# Patient Record
Sex: FEMALE | Race: WHITE | NOT HISPANIC OR LATINO | Employment: UNEMPLOYED | URBAN - NONMETROPOLITAN AREA
[De-identification: names, ages, dates, MRNs, and addresses within clinical notes are randomized per-mention and may not be internally consistent; named-entity substitution may affect disease eponyms.]

---

## 2017-07-10 ENCOUNTER — HOSPITAL ENCOUNTER (EMERGENCY)
Facility: HOSPITAL | Age: 57
Discharge: PSYCHIATRIC OR SUBSTANCE USE FACILITY | End: 2017-07-10
Attending: EMERGENCY MEDICINE | Admitting: EMERGENCY MEDICINE

## 2017-07-10 ENCOUNTER — HOSPITAL ENCOUNTER (INPATIENT)
Facility: HOSPITAL | Age: 57
LOS: 11 days | Discharge: HOME | End: 2017-07-21
Attending: PSYCHIATRY & NEUROLOGY | Admitting: PSYCHIATRY & NEUROLOGY

## 2017-07-10 VITALS
WEIGHT: 200 LBS | RESPIRATION RATE: 20 BRPM | TEMPERATURE: 99 F | SYSTOLIC BLOOD PRESSURE: 112 MMHG | OXYGEN SATURATION: 100 % | HEART RATE: 98 BPM | DIASTOLIC BLOOD PRESSURE: 68 MMHG | BODY MASS INDEX: 33.32 KG/M2 | HEIGHT: 65 IN

## 2017-07-10 DIAGNOSIS — F31.62 BIPOLAR DISORDER, CURRENT EPISODE MIXED, MODERATE (HCC): Primary | ICD-10-CM

## 2017-07-10 PROBLEM — F31.11 BIPOLAR 1 DISORDER, MANIC, MILD (HCC): Status: ACTIVE | Noted: 2017-07-10

## 2017-07-10 LAB
6-ACETYL MORPHINE: NEGATIVE
ALBUMIN SERPL-MCNC: 4.3 G/DL (ref 3.5–5)
ALBUMIN/GLOB SERPL: 1.4 G/DL (ref 1.5–2.5)
ALP SERPL-CCNC: 98 U/L (ref 35–104)
ALT SERPL W P-5'-P-CCNC: 16 U/L (ref 10–36)
AMPHET+METHAMPHET UR QL: NEGATIVE
ANION GAP SERPL CALCULATED.3IONS-SCNC: 2.4 MMOL/L (ref 3.6–11.2)
AST SERPL-CCNC: 23 U/L (ref 10–30)
B-HCG UR QL: NEGATIVE
BARBITURATES UR QL SCN: NEGATIVE
BASOPHILS # BLD AUTO: 0.04 10*3/MM3 (ref 0–0.3)
BASOPHILS NFR BLD AUTO: 0.4 % (ref 0–2)
BENZODIAZ UR QL SCN: NEGATIVE
BILIRUB SERPL-MCNC: 0.3 MG/DL (ref 0.2–1.8)
BILIRUB UR QL STRIP: NEGATIVE
BUN BLD-MCNC: 10 MG/DL (ref 7–21)
BUN/CREAT SERPL: 12 (ref 7–25)
BUPRENORPHINE SERPL-MCNC: NEGATIVE NG/ML
CALCIUM SPEC-SCNC: 10.1 MG/DL (ref 7.7–10)
CANNABINOIDS SERPL QL: NEGATIVE
CHLORIDE SERPL-SCNC: 111 MMOL/L (ref 99–112)
CLARITY UR: CLEAR
CO2 SERPL-SCNC: 28.6 MMOL/L (ref 24.3–31.9)
COCAINE UR QL: NEGATIVE
COLOR UR: YELLOW
CREAT BLD-MCNC: 0.83 MG/DL (ref 0.43–1.29)
DEPRECATED RDW RBC AUTO: 47.8 FL (ref 37–54)
EOSINOPHIL # BLD AUTO: 0.23 10*3/MM3 (ref 0–0.7)
EOSINOPHIL NFR BLD AUTO: 2.5 % (ref 0–5)
ERYTHROCYTE [DISTWIDTH] IN BLOOD BY AUTOMATED COUNT: 14.2 % (ref 11.5–14.5)
ETHANOL BLD-MCNC: <10 MG/DL
ETHANOL UR QL: <0.01 %
GFR SERPL CREATININE-BSD FRML MDRD: 71 ML/MIN/1.73
GLOBULIN UR ELPH-MCNC: 3 GM/DL
GLUCOSE BLD-MCNC: 98 MG/DL (ref 70–110)
GLUCOSE UR STRIP-MCNC: NEGATIVE MG/DL
HCT VFR BLD AUTO: 30.3 % (ref 37–47)
HGB BLD-MCNC: 9.9 G/DL (ref 12–16)
HGB UR QL STRIP.AUTO: NEGATIVE
IMM GRANULOCYTES # BLD: 0.02 10*3/MM3 (ref 0–0.03)
IMM GRANULOCYTES NFR BLD: 0.2 % (ref 0–0.5)
KETONES UR QL STRIP: NEGATIVE
LEUKOCYTE ESTERASE UR QL STRIP.AUTO: NEGATIVE
LYMPHOCYTES # BLD AUTO: 2.44 10*3/MM3 (ref 1–3)
LYMPHOCYTES NFR BLD AUTO: 26.8 % (ref 21–51)
MCH RBC QN AUTO: 30.3 PG (ref 27–33)
MCHC RBC AUTO-ENTMCNC: 32.7 G/DL (ref 33–37)
MCV RBC AUTO: 92.7 FL (ref 80–94)
METHADONE UR QL SCN: NEGATIVE
MONOCYTES # BLD AUTO: 0.72 10*3/MM3 (ref 0.1–0.9)
MONOCYTES NFR BLD AUTO: 7.9 % (ref 0–10)
NEUTROPHILS # BLD AUTO: 5.64 10*3/MM3 (ref 1.4–6.5)
NEUTROPHILS NFR BLD AUTO: 62.2 % (ref 30–70)
NITRITE UR QL STRIP: NEGATIVE
OPIATES UR QL: NEGATIVE
OSMOLALITY SERPL CALC.SUM OF ELEC: 282.1 MOSM/KG (ref 273–305)
OXYCODONE UR QL SCN: NEGATIVE
PCP UR QL SCN: NEGATIVE
PH UR STRIP.AUTO: 6.5 [PH] (ref 5–8)
PLATELET # BLD AUTO: 371 10*3/MM3 (ref 130–400)
PMV BLD AUTO: 8.6 FL (ref 6–10)
POTASSIUM BLD-SCNC: 4.3 MMOL/L (ref 3.5–5.3)
PROT SERPL-MCNC: 7.3 G/DL (ref 6–8)
PROT UR QL STRIP: NEGATIVE
RBC # BLD AUTO: 3.27 10*6/MM3 (ref 4.2–5.4)
SODIUM BLD-SCNC: 142 MMOL/L (ref 135–153)
SP GR UR STRIP: <=1.005 (ref 1–1.03)
UROBILINOGEN UR QL STRIP: NORMAL
WBC NRBC COR # BLD: 9.09 10*3/MM3 (ref 4.5–12.5)

## 2017-07-10 PROCEDURE — 81025 URINE PREGNANCY TEST: CPT | Performed by: PHYSICIAN ASSISTANT

## 2017-07-10 RX ORDER — ALUMINA, MAGNESIA, AND SIMETHICONE 2400; 2400; 240 MG/30ML; MG/30ML; MG/30ML
15 SUSPENSION ORAL EVERY 6 HOURS PRN
Status: DISCONTINUED | OUTPATIENT
Start: 2017-07-10 | End: 2017-07-21 | Stop reason: HOSPADM

## 2017-07-10 RX ORDER — HYDROXYZINE 50 MG/1
50 TABLET, FILM COATED ORAL EVERY 6 HOURS PRN
Status: DISCONTINUED | OUTPATIENT
Start: 2017-07-10 | End: 2017-07-21 | Stop reason: HOSPADM

## 2017-07-10 RX ORDER — QUETIAPINE FUMARATE 100 MG/1
100 TABLET, FILM COATED ORAL NIGHTLY
Status: DISCONTINUED | OUTPATIENT
Start: 2017-07-10 | End: 2017-07-10 | Stop reason: SDUPTHER

## 2017-07-10 RX ORDER — FAMOTIDINE 20 MG/1
20 TABLET, FILM COATED ORAL 2 TIMES DAILY PRN
Status: DISCONTINUED | OUTPATIENT
Start: 2017-07-10 | End: 2017-07-21 | Stop reason: HOSPADM

## 2017-07-10 RX ORDER — ECHINACEA PURPUREA EXTRACT 125 MG
2 TABLET ORAL AS NEEDED
Status: DISCONTINUED | OUTPATIENT
Start: 2017-07-10 | End: 2017-07-21 | Stop reason: HOSPADM

## 2017-07-10 RX ORDER — QUETIAPINE FUMARATE 100 MG/1
100 TABLET, FILM COATED ORAL NIGHTLY
COMMUNITY
End: 2017-07-21 | Stop reason: HOSPADM

## 2017-07-10 RX ORDER — TRAZODONE HYDROCHLORIDE 50 MG/1
50 TABLET ORAL NIGHTLY PRN
Status: DISCONTINUED | OUTPATIENT
Start: 2017-07-10 | End: 2017-07-21 | Stop reason: HOSPADM

## 2017-07-10 RX ORDER — NICOTINE 21 MG/24HR
1 PATCH, TRANSDERMAL 24 HOURS TRANSDERMAL
Status: DISCONTINUED | OUTPATIENT
Start: 2017-07-10 | End: 2017-07-21 | Stop reason: HOSPADM

## 2017-07-10 RX ORDER — BENZONATATE 100 MG/1
100 CAPSULE ORAL 3 TIMES DAILY PRN
Status: DISCONTINUED | OUTPATIENT
Start: 2017-07-10 | End: 2017-07-21 | Stop reason: HOSPADM

## 2017-07-10 RX ORDER — IBUPROFEN 400 MG/1
600 TABLET ORAL EVERY 6 HOURS PRN
Status: DISCONTINUED | OUTPATIENT
Start: 2017-07-10 | End: 2017-07-21 | Stop reason: HOSPADM

## 2017-07-10 RX ORDER — ONDANSETRON 4 MG/1
4 TABLET, FILM COATED ORAL EVERY 6 HOURS PRN
Status: DISCONTINUED | OUTPATIENT
Start: 2017-07-10 | End: 2017-07-21 | Stop reason: HOSPADM

## 2017-07-10 RX ORDER — LOPERAMIDE HYDROCHLORIDE 2 MG/1
2 CAPSULE ORAL 4 TIMES DAILY PRN
Status: DISCONTINUED | OUTPATIENT
Start: 2017-07-10 | End: 2017-07-21 | Stop reason: HOSPADM

## 2017-07-10 RX ORDER — QUETIAPINE FUMARATE 100 MG/1
100 TABLET, FILM COATED ORAL NIGHTLY
Status: DISCONTINUED | OUTPATIENT
Start: 2017-07-10 | End: 2017-07-11

## 2017-07-10 RX ADMIN — ALUMINUM HYDROXIDE, MAGNESIUM HYDROXIDE, AND DIMETHICONE 15 ML: 400; 400; 40 SUSPENSION ORAL at 21:55

## 2017-07-10 RX ADMIN — NICOTINE 1 PATCH: 21 PATCH TRANSDERMAL at 21:55

## 2017-07-10 RX ADMIN — HYDROXYZINE HYDROCHLORIDE 50 MG: 50 TABLET ORAL at 23:21

## 2017-07-10 RX ADMIN — QUETIAPINE FUMARATE 100 MG: 100 TABLET ORAL at 21:53

## 2017-07-10 RX ADMIN — FAMOTIDINE 20 MG: 20 TABLET ORAL at 23:21

## 2017-07-10 NOTE — ED PROVIDER NOTES
Subjective   Patient is a 56 y.o. female presenting with mental health disorder.   Mental Health Problem   Presenting symptoms: aggressive behavior, agitation and depression    Degree of incapacity (severity):  Moderate  Onset quality:  Gradual  Duration:  1 week  Timing:  Intermittent  Progression:  Waxing and waning  Chronicity:  Recurrent  Context: not alcohol use and not drug abuse    Treatment compliance:  Untreated  Relieved by:  Nothing  Worsened by:  Nothing  Associated symptoms: anxiety and feelings of worthlessness    Associated symptoms: no abdominal pain and no chest pain        Review of Systems   Constitutional: Negative.  Negative for fever.   HENT: Negative.    Respiratory: Negative.    Cardiovascular: Negative.  Negative for chest pain.   Gastrointestinal: Negative.  Negative for abdominal pain.   Endocrine: Negative.    Genitourinary: Negative.  Negative for dysuria.   Skin: Negative.    Neurological: Negative.    Psychiatric/Behavioral: Positive for agitation. The patient is nervous/anxious.    All other systems reviewed and are negative.      Past Medical History:   Diagnosis Date   • Bipolar disorder    • Mood disorder        No Known Allergies    History reviewed. No pertinent surgical history.    History reviewed. No pertinent family history.    Social History     Social History   • Marital status: Single     Spouse name: N/A   • Number of children: N/A   • Years of education: N/A     Social History Main Topics   • Smoking status: Current Every Day Smoker     Packs/day: 1.00     Types: Cigarettes   • Smokeless tobacco: None   • Alcohol use No   • Drug use: No      Comment: pt denies-family states they feel she is using   • Sexual activity: Defer     Other Topics Concern   • None     Social History Narrative   • None           Objective   Physical Exam   Constitutional: She is oriented to person, place, and time. She appears well-developed and well-nourished. No distress.   HENT:   Head:  Normocephalic and atraumatic.   Right Ear: External ear normal.   Left Ear: External ear normal.   Nose: Nose normal.   Eyes: Conjunctivae and EOM are normal. Pupils are equal, round, and reactive to light.   Neck: Normal range of motion. Neck supple. No JVD present. No tracheal deviation present.   Cardiovascular: Normal rate, regular rhythm and normal heart sounds.    No murmur heard.  Pulmonary/Chest: Effort normal and breath sounds normal. No respiratory distress. She has no wheezes.   Abdominal: Soft. Bowel sounds are normal. There is no tenderness.   Musculoskeletal: Normal range of motion. She exhibits no edema or deformity.   Neurological: She is alert and oriented to person, place, and time. No cranial nerve deficit.   Skin: Skin is warm and dry. No rash noted. She is not diaphoretic. No erythema. No pallor.   Psychiatric: She has a normal mood and affect. Her behavior is normal. Thought content normal.   Nursing note and vitals reviewed.      Procedures         ED Course  ED Course                  MDM  Number of Diagnoses or Management Options  Bipolar disorder, current episode mixed, moderate: established and worsening     Amount and/or Complexity of Data Reviewed  Clinical lab tests: ordered and reviewed  Discuss the patient with other providers: yes    Risk of Complications, Morbidity, and/or Mortality  Presenting problems: moderate        Final diagnoses:   Bipolar disorder, current episode mixed, moderate            MIKAEL Nichols  07/10/17 1809       MIKAEL Nichols  07/10/17 1930
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
## 2017-07-11 RX ORDER — QUETIAPINE FUMARATE 100 MG/1
100 TABLET, FILM COATED ORAL EVERY 12 HOURS SCHEDULED
Status: DISCONTINUED | OUTPATIENT
Start: 2017-07-11 | End: 2017-07-12

## 2017-07-11 RX ADMIN — IBUPROFEN 600 MG: 400 TABLET ORAL at 03:23

## 2017-07-11 RX ADMIN — BENZONATATE 100 MG: 100 CAPSULE ORAL at 09:53

## 2017-07-11 RX ADMIN — NICOTINE 1 PATCH: 21 PATCH TRANSDERMAL at 08:36

## 2017-07-11 RX ADMIN — IBUPROFEN 600 MG: 400 TABLET ORAL at 14:51

## 2017-07-11 RX ADMIN — HYDROXYZINE HYDROCHLORIDE 50 MG: 50 TABLET ORAL at 14:51

## 2017-07-11 RX ADMIN — FAMOTIDINE 20 MG: 20 TABLET ORAL at 15:52

## 2017-07-11 RX ADMIN — HYDROXYZINE HYDROCHLORIDE 50 MG: 50 TABLET ORAL at 08:36

## 2017-07-11 RX ADMIN — ALUMINUM HYDROXIDE, MAGNESIUM HYDROXIDE, AND DIMETHICONE 15 ML: 400; 400; 40 SUSPENSION ORAL at 09:06

## 2017-07-11 RX ADMIN — ALUMINUM HYDROXIDE, MAGNESIUM HYDROXIDE, AND DIMETHICONE 15 ML: 400; 400; 40 SUSPENSION ORAL at 21:40

## 2017-07-11 RX ADMIN — QUETIAPINE FUMARATE 100 MG: 100 TABLET ORAL at 21:04

## 2017-07-11 RX ADMIN — IBUPROFEN 600 MG: 400 TABLET ORAL at 22:31

## 2017-07-11 RX ADMIN — BENZONATATE 100 MG: 100 CAPSULE ORAL at 21:40

## 2017-07-11 RX ADMIN — HYDROXYZINE HYDROCHLORIDE 50 MG: 50 TABLET ORAL at 23:10
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
## 2017-07-11 NOTE — DISCHARGE INSTR - APPOINTMENTS
[place] [id number]  [facility] [id number]  [phone]    July 28 2017 at 8:30am with Daily
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
## 2017-07-11 NOTE — PLAN OF CARE
Problem: BH Patient Care Overview (Adult)  Goal: Plan of Care Review  Outcome: Ongoing (interventions implemented as appropriate)    07/11/17 1636   Coping/Psychosocial Response Interventions   Plan Of Care Reviewed With patient   Coping/Psychosocial   Patient Agreement with Plan of Care agrees   Patient Care Overview   Progress no change   Outcome Evaluation   Outcome Summary/Follow up Plan PT REPORTS ANXIETY 6/10. DENIES ANY DEPRESSION, SI, HI, OR A/V HALLUCINATIONS.         Problem:  Overarching Goals  Goal: Adheres to Safety Considerations for Self and Others  Outcome: Ongoing (interventions implemented as appropriate)  Goal: Optimized Coping Skills in Response to Life Stressors  Outcome: Ongoing (interventions implemented as appropriate)  Goal: Develops/Participates in Therapeutic [place] to Support Successful Transition  Outcome: Ongoing (interventions implemented as appropriate)

## 2017-07-11 NOTE — PLAN OF CARE
Problem: BH Overarching Goals  Goal: Develops/Participates in Therapeutic [place] to Support Successful Transition  Outcome: Ongoing (interventions implemented as appropriate)
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
## 2017-07-12 RX ORDER — QUETIAPINE FUMARATE 300 MG/1
300 TABLET, FILM COATED ORAL NIGHTLY
Status: DISCONTINUED | OUTPATIENT
Start: 2017-07-12 | End: 2017-07-13

## 2017-07-12 RX ORDER — QUETIAPINE FUMARATE 100 MG/1
100 TABLET, FILM COATED ORAL DAILY
Status: DISCONTINUED | OUTPATIENT
Start: 2017-07-13 | End: 2017-07-14

## 2017-07-12 RX ADMIN — NICOTINE 1 PATCH: 21 PATCH TRANSDERMAL at 08:15

## 2017-07-12 RX ADMIN — FAMOTIDINE 20 MG: 20 TABLET ORAL at 22:18

## 2017-07-12 RX ADMIN — QUETIAPINE FUMARATE 300 MG: 300 TABLET ORAL at 22:18

## 2017-07-12 RX ADMIN — BENZONATATE 100 MG: 100 CAPSULE ORAL at 08:15

## 2017-07-12 RX ADMIN — IBUPROFEN 600 MG: 400 TABLET ORAL at 14:06

## 2017-07-12 RX ADMIN — IBUPROFEN 600 MG: 400 TABLET ORAL at 23:44

## 2017-07-12 RX ADMIN — IBUPROFEN 600 MG: 400 TABLET ORAL at 08:25

## 2017-07-12 RX ADMIN — HYDROXYZINE HYDROCHLORIDE 50 MG: 50 TABLET ORAL at 14:05

## 2017-07-12 RX ADMIN — QUETIAPINE FUMARATE 100 MG: 100 TABLET ORAL at 08:14

## 2017-07-12 RX ADMIN — FAMOTIDINE 20 MG: 20 TABLET ORAL at 04:42

## 2017-07-12 RX ADMIN — HYDROXYZINE HYDROCHLORIDE 50 MG: 50 TABLET ORAL at 23:26

## 2017-07-12 NOTE — PLAN OF CARE
Problem:  Patient Care Overview (Adult)  Goal: Plan of Care Review  Outcome: Ongoing (interventions implemented as appropriate)    07/12/17 1442   Coping/Psychosocial Response Interventions   Plan Of Care Reviewed With patient   Coping/Psychosocial   Patient Agreement with Plan of Care agrees   Patient Care Overview   Progress no change   Outcome Evaluation   Outcome Summary/Follow up Plan pt. verbalzies has been up and down is oriented to self place rates anx. a 6 deneis any s/i SHE VERBALZIES SOMETIMES FEELS AFRAIDOF THE WORLD SHE WAS CO'S PEER IN ROOM WAS CUSSING @ her she wanted staff ot stand @ door when she goes into room staff Ceci ambrocio reported pt was getting peer clothing from closet private room order was obtained          Problem:  Overarching Goals  Goal: Adheres to Safety Considerations for Self and Others  Outcome: Ongoing (interventions implemented as appropriate)  Goal: Optimized Coping Skills in Response to Life Stressors  Outcome: Ongoing (interventions implemented as appropriate)  Goal: Develops/Participates in Therapeutic [place] to Support Successful Transition  Outcome: Ongoing (interventions implemented as appropriate)

## 2017-07-12 NOTE — PROGRESS NOTES
"   LOS: 2 days   Patient Care Team:  Karan Purcell MD as PCP - General (Family Medicine)    Chief Complaint:  Patient complains she had argument with roommate because she jumped up and hollered at her, told her to get out of the room, nurse accused her of wearing her roommates clothes.  She denies depression but says this has made her nervous, rates her anxiety at 7 or 8/10.  She has slept only 3-4 hours.  She says she is called and talked to her -2 sisters and a sister-in-law, and did \" great\" on the phone she says she wants to go back to her trailer and be with her dog and her son.  She says \"you can call if you want to\" when I asked her if he can talk to her son and daughter-in-law, gives the number as [phone].  She also gives permission to talk to her sisters or her sister-in-law.      Interval History: The have verification from her [facility] showing that she filled a prescription for Seroquel 300 mg twice a day by her family physician on 7/6/2017          Vital Signs    Temp:  [97.5 °F (36.4 °C)-97.7 °F (36.5 °C)] 97.5 °F (36.4 °C)  Heart Rate:  [86-93] 86  Resp:  [18] 18  BP: (138-148)/(79-86) 148/86    Lab Results:   Lab Results (last 24 hours)     ** No results found for the last 24 hours. **           Labs:     Lab Results (last 24 hours)     ** No results found for the last 24 hours. **                        Exam:  Completed:  completed within view of staff  Mental Status Exam:     Hygiene:   good  Cooperation:  Cooperative  Eye Contact:  Good  Psychomotor Behavior:  Aggitated much less than she was yesterday  Affect:  Appropriate  Speech:  Normal  Thought Progress:  Goal directed  Thought Content:  Mood congurent  Suicidal:  None  Homicidal:  None  Hallucinations:  None  Delusion:  Paranoid  Memory:  Intact  Orientation:  Person, Place, Time and Situation  Reliability:  fair  Insight:  Fair  Judgement:  Impaired  Impulse Control:  Fair      Results Review:    Lab Results (last 24 " hours)     ** No results found for the last 24 hours. **          Medication Review:  Hospital Medications (active)       Dose Frequency Start End    aluminum-magnesium hydroxide-simethicone (MAALOX MAX) 400-400-40 MG/5ML suspension 15 mL 15 mL Every 6 Hours PRN 7/10/2017     Sig - Route: Take 15 mL by mouth Every 6 (Six) Hours As Needed for Indigestion or Heartburn. - Oral    benzonatate (TESSALON) capsule 100 mg 100 mg 3 Times Daily PRN 7/10/2017     Sig - Route: Take 1 capsule by mouth 3 (Three) Times a Day As Needed for Cough. - Oral    famotidine (PEPCID) tablet 20 mg 20 mg 2 Times Daily PRN 7/10/2017     Sig - Route: Take 1 tablet by mouth 2 (Two) Times a Day As Needed for Heartburn. - Oral    hydrOXYzine (ATARAX) tablet 50 mg 50 mg Every 6 Hours PRN 7/10/2017     Sig - Route: Take 1 tablet by mouth Every 6 (Six) Hours As Needed for Anxiety. - Oral    ibuprofen (ADVIL,MOTRIN) tablet 600 mg 600 mg Every 6 Hours PRN 7/10/2017     Sig - Route: Take 1.5 tablets by mouth Every 6 (Six) Hours As Needed for Mild Pain (1-3) or Moderate Pain (4-6) (severe pain (7-10)). - Oral    loperamide (IMODIUM) capsule 2 mg 2 mg 4 Times Daily PRN 7/10/2017     Sig - Route: Take 1 capsule by mouth 4 (Four) Times a Day As Needed for Diarrhea. - Oral    magnesium hydroxide (MILK OF MAGNESIA) suspension 2400 mg/10mL 10 mL 10 mL Daily PRN 7/10/2017     Sig - Route: Take 10 mL by mouth Daily As Needed for Constipation. - Oral    nicotine (NICODERM CQ) 21 MG/24HR patch 1 patch 1 patch Every 24 Hours Scheduled 7/10/2017     Sig - Route: Place 1 patch on the skin Daily. - Transdermal    ondansetron (ZOFRAN) tablet 4 mg 4 mg Every 6 Hours PRN 7/10/2017     Sig - Route: Take 1 tablet by mouth Every 6 (Six) Hours As Needed for Nausea or Vomiting. - Oral    pneumococcal polysaccharide 23-valent (PNEUMOVAX-23) vaccine 0.5 mL 0.5 mL During Hospitalization 7/10/2017     Sig - Route: Inject 0.5 mL into the shoulder, thigh, or buttocks During  Hospitalization for Immunization. - Intramuscular    Cosign for Ordering: Required by Tavo Bustillo MD    QUEtiapine (SEROquel) tablet 100 mg 100 mg Every 12 Hours Scheduled 7/11/2017     Sig - Route: Take 1 tablet by mouth Every 12 (Twelve) Hours. - Oral    sodium chloride (OCEAN) nasal spray 2 spray 2 spray As Needed 7/10/2017     Sig - Route: 2 sprays by Each Nare route As Needed for Congestion. - Each Nare    traZODone (DESYREL) tablet 50 mg 50 mg Nightly PRN 7/10/2017     Sig - Route: Take 1 tablet by mouth At Night As Needed for Sleep. - Oral    QUEtiapine (SEROquel) tablet 100 mg (Discontinued) 100 mg Nightly 7/10/2017 7/11/2017    Sig - Route: Take 1 tablet by mouth Every Night. - Oral           Special Precautions Level: IV    Other Pertinent Information      Assessment/Plan     Assessment: Assessment/Diagnosis: Psychosis NOS      Treatment Plan: Change made to plan and Increase Seroquel to 100 mg a.m. and 300 mg at bedtime, therapist may call family for collateral information      Treatment Plan discussed with: Patient     I have r patienteviewed and approved the behavioral health treatment plans and problem list. Yes    Plan for disposition patient says she will return home     Tavo Bustillo MD  07/12/17  11:36 AM
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
## 2017-07-12 NOTE — PLAN OF CARE
Problem:  Patient Care Overview (Adult)  Goal: Plan of Care Review  Outcome: Ongoing (interventions implemented as appropriate)    07/12/17 0319   Coping/Psychosocial Response Interventions   Plan Of Care Reviewed With patient   Coping/Psychosocial   Patient Agreement with Plan of Care agrees   Patient Care Overview   Progress no change   Outcome Evaluation   Outcome Summary/Follow up Plan Patient calm and cooperative this shift. Rates anxiety a 5/10. Denies depression, SI/HI, or halllucinations.          Problem:  Overarching Goals  Goal: Adheres to Safety Considerations for Self and Others  Outcome: Ongoing (interventions implemented as appropriate)  Goal: Optimized Coping Skills in Response to Life Stressors  Outcome: Ongoing (interventions implemented as appropriate)  Goal: Develops/Participates in Therapeutic [place] to Support Successful Transition  Outcome: Ongoing (interventions implemented as appropriate)

## 2017-07-13 RX ADMIN — BENZONATATE 100 MG: 100 CAPSULE ORAL at 17:24

## 2017-07-13 RX ADMIN — QUETIAPINE FUMARATE 100 MG: 100 TABLET ORAL at 08:13

## 2017-07-13 RX ADMIN — NICOTINE 1 PATCH: 21 PATCH TRANSDERMAL at 08:13

## 2017-07-13 RX ADMIN — FAMOTIDINE 20 MG: 20 TABLET ORAL at 08:29

## 2017-07-13 RX ADMIN — IBUPROFEN 600 MG: 400 TABLET ORAL at 16:56

## 2017-07-13 RX ADMIN — HYDROXYZINE HYDROCHLORIDE 50 MG: 50 TABLET ORAL at 13:43

## 2017-07-13 RX ADMIN — QUETIAPINE FUMARATE 400 MG: 300 TABLET ORAL at 20:29

## 2017-07-13 RX ADMIN — HYDROXYZINE HYDROCHLORIDE 50 MG: 50 TABLET ORAL at 21:49

## 2017-07-13 RX ADMIN — TRAZODONE HYDROCHLORIDE 50 MG: 50 TABLET ORAL at 22:52

## 2017-07-13 NOTE — PLAN OF CARE
Problem:  Patient Care Overview (Adult)  Goal: Plan of Care Review  Outcome: Ongoing (interventions implemented as appropriate)    07/13/17 0253   Coping/Psychosocial Response Interventions   Plan Of Care Reviewed With patient   Coping/Psychosocial   Patient Agreement with Plan of Care agrees   Patient Care Overview   Progress no change   Outcome Evaluation   Outcome Summary/Follow up Plan Patient continues to seek out staff frequently asking for medications. Patient observed talking to self; responding to internal stimuli. Rates anxiety a 6/10. Denies any depression, SI/HI, or hallucinations.          Problem:  Overarching Goals  Goal: Adheres to Safety Considerations for Self and Others  Outcome: Ongoing (interventions implemented as appropriate)  Goal: Optimized Coping Skills in Response to Life Stressors  Outcome: Ongoing (interventions implemented as appropriate)  Goal: Develops/Participates in Therapeutic [place] to Support Successful Transition  Outcome: Ongoing (interventions implemented as appropriate)
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
## 2017-07-13 NOTE — PLAN OF CARE
Problem: BH Patient Care Overview (Adult)  Goal: Plan of Care Review  Outcome: Ongoing (interventions implemented as appropriate)  Pt. Verbalizes feels rested she verbalizes  doesn't know how long she slept was up during the night rates anx a 6 denies depression she is constantly at nurse desk co;s about channels on t.v. ,co's peers watching t.v. Talking Having t.v. Up to loud redirection given pt. Is cooperative     07/13/17 3418   Coping/Psychosocial Response Interventions   Plan Of Care Reviewed With patient   Coping/Psychosocial   Patient Agreement with Plan of Care agrees   Patient Care Overview   Progress improving         Problem:  Overarching Goals  Goal: Adheres to Safety Considerations for Self and Others  Outcome: Ongoing (interventions implemented as appropriate)  Goal: Optimized Coping Skills in Response to Life Stressors  Outcome: Ongoing (interventions implemented as appropriate)  Goal: Develops/Participates in Therapeutic [place] to Support Successful Transition  Outcome: Ongoing (interventions implemented as appropriate)

## 2017-07-13 NOTE — PROGRESS NOTES
"[id number]    DATA:      Therapist met individually with patient this date. Met in day room with RADHA conner.  Discussed progress in treatment and any needs/concerns. Patient continues to be paranoid and instigate arguments with peers. Has poor boundaries with peers and staff.      Therapist reviewed contact made with patient's brother Richar Kulkarni (please read therapy note from yesterday). She reports, \"Anything he tells you all is right. He knows me well.\" Therapist provided emotional support and continued education.  Patient reports that she is willing to stay in the hospital to get her medicines right.     Patient's daughter in law Daily has called unit.  Therapist attempted call back at [phone]. Reached Daily who reports that she has been patient's guardian for 6 years. She reports that she will bring documentation tonight.  Daily states that patient does live alone and that it has been a while since a full blown manic episode. She reports that she usually has these episodes around major changes in her life.  Most recent change was Daily having a child in late June and patient then stopping all medications and refusing to attend Dr. Madrigal. Patient will stop medications and then start smoking THC and Methamphetamine.   She reports that patient will go for days without sleep and that when she enters a hospital, Ativan is usually needed to get the patient to sleep and become cooperative.  Daily seems supportive of the patient, but reports that she is no where near her baseline and unsafe to return home while manic.       ASSESSMENT:     Patient observed paranoid affect and mood.  She paces unit floor and appears to instigate arguments with others. Patient has continued poor sleep and has been noted to respond to internal stimuli. Continues to be impacted by roseann and unsafe to discharge.        Plan:    Awaiting proof of guardianship.  Aftercare planning may switch to Dr. Madrigal once patient is stable and " guardianship is on file. Daily appears to prefer Dr. Madrigal referral instead of [facility].

## 2017-07-14 RX ORDER — QUETIAPINE FUMARATE 100 MG/1
100 TABLET, FILM COATED ORAL ONCE
Status: COMPLETED | OUTPATIENT
Start: 2017-07-14 | End: 2017-07-14

## 2017-07-14 RX ORDER — QUETIAPINE FUMARATE 100 MG/1
200 TABLET, FILM COATED ORAL DAILY
Status: DISCONTINUED | OUTPATIENT
Start: 2017-07-15 | End: 2017-07-18

## 2017-07-14 RX ADMIN — NICOTINE 1 PATCH: 21 PATCH TRANSDERMAL at 08:03

## 2017-07-14 RX ADMIN — IBUPROFEN 600 MG: 400 TABLET ORAL at 03:17

## 2017-07-14 RX ADMIN — QUETIAPINE FUMARATE 100 MG: 100 TABLET ORAL at 12:50

## 2017-07-14 RX ADMIN — BENZONATATE 100 MG: 100 CAPSULE ORAL at 19:25

## 2017-07-14 RX ADMIN — IBUPROFEN 600 MG: 400 TABLET ORAL at 11:20

## 2017-07-14 RX ADMIN — HYDROXYZINE HYDROCHLORIDE 50 MG: 50 TABLET ORAL at 10:54

## 2017-07-14 RX ADMIN — IBUPROFEN 600 MG: 400 TABLET ORAL at 21:54

## 2017-07-14 RX ADMIN — QUETIAPINE FUMARATE 400 MG: 300 TABLET ORAL at 20:39

## 2017-07-14 RX ADMIN — QUETIAPINE FUMARATE 100 MG: 100 TABLET ORAL at 08:03

## 2017-07-14 RX ADMIN — TRAZODONE HYDROCHLORIDE 50 MG: 50 TABLET ORAL at 21:41

## 2017-07-14 NOTE — PLAN OF CARE
Problem: BH Patient Care Overview (Adult)  Goal: Plan of Care Review  Outcome: Ongoing (interventions implemented as appropriate)  Rates anxiety 10 and depression 0. Denies suicidal thoughts.    07/14/17 1530   Coping/Psychosocial Response Interventions   Plan Of Care Reviewed With patient   Coping/Psychosocial   Patient Agreement with Plan of Care agrees   Patient Care Overview   Progress no change       Goal: Interdisciplinary Rounds/Family Conference  Outcome: Ongoing (interventions implemented as appropriate)  Goal: Individualization and Mutuality  Outcome: Ongoing (interventions implemented as appropriate)  Goal: Discharge Needs Assessment  Outcome: Ongoing (interventions implemented as appropriate)    Problem: BH Overarching Goals  Goal: Adheres to Safety Considerations for Self and Others  Outcome: Ongoing (interventions implemented as appropriate)  Goal: Optimized Coping Skills in Response to Life Stressors  Outcome: Ongoing (interventions implemented as appropriate)  Goal: Develops/Participates in Therapeutic [place] to Support Successful Transition  Outcome: Ongoing (interventions implemented as appropriate)    Problem: Manic Behavior/Episode (Adult)  Goal: Identify Related Risk Factors and Signs and Symptoms  Outcome: Ongoing (interventions implemented as appropriate)  Goal: Mood Equilibrium  Outcome: Ongoing (interventions implemented as appropriate)  Goal: Impulse Control  Outcome: Ongoing (interventions implemented as appropriate)

## 2017-07-14 NOTE — PROGRESS NOTES
"1145:     DATA:      Therapist met individually with patient this date. Discussed progress in treatment and any needs/concerns. Patient remains delusional. She reports, \"You working on my discharge medicines?\"  Patient remains redirectable, but overall intrusive and inappropriate. Continues to paranoid about daughter-in-law.  However, her brother in law appears to support the report of the guardian regarding patient being paranoid and manic.       ASSESSMENT:     Patient observed to have disorganized thought content.  She continues to be restless and paces unit floor. Poor boundaries with staff and peers.  Appears paranoid that others on unit are talking about her.  Family reports that Ativan helps her sleep when prescribed by Dr. Madrigal. Patient is intrusive with other patients, often standing behind them and rambling to them. Sometimes believing they are confronting her.  Says that she has fallen in love on the unit with a male peer.     Plan:    Patient to remain hospitalized through the weekend.  Awaiting proof of guardianship from daughter in law Daily.. Aftercare planning may switch to Dr. Madrigal once patient is stable and guardianship is on file. Daily appears to prefer Dr. Madrigal referral instead of [facility].   "

## 2017-07-14 NOTE — PROGRESS NOTES
LOS: 4 days   Patient Care Team:  Karan Purcell MD as PCP - General (Family Medicine)    Chief Complaint:  Patient says she is anxious today because she has fell in love with a pedro on the unit, also says that she has been in love with her brother-in-law.      Interval History:             Vital Signs    Temp:  [96.2 °F (35.7 °C)-98 °F (36.7 °C)] 96.2 °F (35.7 °C)  Heart Rate:  [] 92  Resp:  [18] 18  BP: (124-158)/(76-87) 124/76    Lab Results:   Lab Results (last 24 hours)     ** No results found for the last 24 hours. **           Labs:     Lab Results (last 24 hours)     ** No results found for the last 24 hours. **                        Exam:  Completed:  completed within view of staff  Mental Status Exam:     Hygiene:   good  Cooperation:  Cooperative  Eye Contact:  Good  Psychomotor Behavior:  Appropriate  Affect:  Appropriate  Speech:  Pressured  Thought Progress:  Disorganized  Thought Content:  Mood congurent  Suicidal:  None  Homicidal:  None  Hallucinations:  None  Delusion:  None  Memory:  Intact  Orientation:  Person, Place and Time  Reliability:  fair  Insight:  Poor  Judgement:  Poor  Impulse Control:  Fair      Results Review:    Lab Results (last 24 hours)     ** No results found for the last 24 hours. **          Medication Review:  Hospital Medications (active)       Dose Frequency Start End    aluminum-magnesium hydroxide-simethicone (MAALOX MAX) 400-400-40 MG/5ML suspension 15 mL 15 mL Every 6 Hours PRN 7/10/2017     Sig - Route: Take 15 mL by mouth Every 6 (Six) Hours As Needed for Indigestion or Heartburn. - Oral    benzonatate (TESSALON) capsule 100 mg 100 mg 3 Times Daily PRN 7/10/2017     Sig - Route: Take 1 capsule by mouth 3 (Three) Times a Day As Needed for Cough. - Oral    famotidine (PEPCID) tablet 20 mg 20 mg 2 Times Daily PRN 7/10/2017     Sig - Route: Take 1 tablet by mouth 2 (Two) Times a Day As Needed for Heartburn. - Oral    hydrOXYzine (ATARAX) tablet 50 mg 50 mg  Every 6 Hours PRN 7/10/2017     Sig - Route: Take 1 tablet by mouth Every 6 (Six) Hours As Needed for Anxiety. - Oral    ibuprofen (ADVIL,MOTRIN) tablet 600 mg 600 mg Every 6 Hours PRN 7/10/2017     Sig - Route: Take 1.5 tablets by mouth Every 6 (Six) Hours As Needed for Mild Pain (1-3) or Moderate Pain  (severe pain (7-10)). - Oral    loperamide (IMODIUM) capsule 2 mg 2 mg 4 Times Daily PRN 7/10/2017     Sig - Route: Take 1 capsule by mouth 4 (Four) Times a Day As Needed for Diarrhea. - Oral    magnesium hydroxide (MILK OF MAGNESIA) suspension 2400 mg/10mL 10 mL 10 mL Daily PRN 7/10/2017     Sig - Route: Take 10 mL by mouth Daily As Needed for Constipation. - Oral    nicotine (NICODERM CQ) 21 MG/24HR patch 1 patch 1 patch Every 24 Hours Scheduled 7/10/2017     Sig - Route: Place 1 patch on the skin Daily. - Transdermal    ondansetron (ZOFRAN) tablet 4 mg 4 mg Every 6 Hours PRN 7/10/2017     Sig - Route: Take 1 tablet by mouth Every 6 (Six) Hours As Needed for Nausea or Vomiting. - Oral    pneumococcal polysaccharide 23-valent (PNEUMOVAX-23) vaccine 0.5 mL 0.5 mL During Hospitalization 7/10/2017     Sig - Route: Inject 0.5 mL into the shoulder, thigh, or buttocks During Hospitalization for Immunization. - Intramuscular    Cosign for Ordering: Accepted by Tavo Bustillo MD on 7/12/2017  2:47 PM    QUEtiapine (SEROquel) tablet 100 mg 100 mg Daily 7/13/2017     Sig - Route: Take 1 tablet by mouth Daily. - Oral    QUEtiapine (SEROquel) tablet 400 mg 400 mg Nightly 7/13/2017     Sig - Route: Take 400 mg by mouth Every Night. - Oral    sodium chloride (OCEAN) nasal spray 2 spray 2 spray As Needed 7/10/2017     Sig - Route: 2 sprays by Each Nare route As Needed for Congestion. - Each Nare    traZODone (DESYREL) tablet 50 mg 50 mg Nightly PRN 7/10/2017     Sig - Route: Take 1 tablet by mouth At Night As Needed for Sleep. - Oral    QUEtiapine (SEROquel) tablet 300 mg (Discontinued) 300 mg Nightly 7/12/2017 7/13/2017     Sig - Route: Take 1 tablet by mouth Every Night. - Oral           Special Precautions Level: III        Assessment/Plan     Assessment: Psychosis NOS  Bipolar disorder hypomanic with psychosis    Treatment Plan: Change made to plan and Increase Seroquel to 200 mg a.m. and 400 mg at bedtime.  Patient declines  Latuda.      Treatment Plan discussed with: Patient    I have reviewed and approved the behavioral health treatment plans and problem list. Yes      Tavo Bustillo MD  07/14/17  10:54 AM

## 2017-07-14 NOTE — PLAN OF CARE
"Problem:  Patient Care Overview (Adult)  Goal: Plan of Care Review  Outcome: Ongoing (interventions implemented as appropriate)  Rated anxiety as 8 and depression as  9. Denies S.I. Pt. is intrusive and concerned about what is going on with the other patients and constantly telling staff what the other patients need. Pt. has slept approx. one hour, however this a.m. She says she has \"slept and slept and slept.    07/14/17 0556   Coping/Psychosocial Response Interventions   Plan Of Care Reviewed With patient   Coping/Psychosocial   Patient Agreement with Plan of Care agrees   Patient Care Overview   Progress no change         Problem:  Overarching Goals  Goal: Adheres to Safety Considerations for Self and Others  Outcome: Ongoing (interventions implemented as appropriate)  Goal: Optimized Coping Skills in Response to Life Stressors  Outcome: Ongoing (interventions implemented as appropriate)  Goal: Develops/Participates in Therapeutic [place] to Support Successful Transition  Outcome: Ongoing (interventions implemented as appropriate)      "

## 2017-07-15 PROCEDURE — 99232 SBSQ HOSP IP/OBS MODERATE 35: CPT | Performed by: PSYCHIATRY & NEUROLOGY

## 2017-07-15 RX ADMIN — NICOTINE 1 PATCH: 21 PATCH TRANSDERMAL at 08:01

## 2017-07-15 RX ADMIN — QUETIAPINE FUMARATE 400 MG: 300 TABLET ORAL at 21:33

## 2017-07-15 RX ADMIN — TRAZODONE HYDROCHLORIDE 50 MG: 50 TABLET ORAL at 23:01

## 2017-07-15 RX ADMIN — IBUPROFEN 600 MG: 400 TABLET ORAL at 13:32

## 2017-07-15 RX ADMIN — QUETIAPINE FUMARATE 200 MG: 100 TABLET ORAL at 08:01

## 2017-07-15 RX ADMIN — ALUMINUM HYDROXIDE, MAGNESIUM HYDROXIDE, AND DIMETHICONE 15 ML: 400; 400; 40 SUSPENSION ORAL at 04:39

## 2017-07-15 RX ADMIN — HYDROXYZINE HYDROCHLORIDE 50 MG: 50 TABLET ORAL at 09:22

## 2017-07-15 RX ADMIN — ALUMINUM HYDROXIDE, MAGNESIUM HYDROXIDE, AND DIMETHICONE 15 ML: 400; 400; 40 SUSPENSION ORAL at 15:28

## 2017-07-15 NOTE — PLAN OF CARE
Problem: BH Patient Care Overview (Adult)  Goal: Plan of Care Review  Outcome: Ongoing (interventions implemented as appropriate)  Rates anxiety 10 and depression 0. Denies suicidal thoughts.   Goal: Interdisciplinary Rounds/Family Conference  Outcome: Ongoing (interventions implemented as appropriate)  Goal: Individualization and Mutuality  Outcome: Ongoing (interventions implemented as appropriate)  Goal: Discharge Needs Assessment  Outcome: Ongoing (interventions implemented as appropriate)    Problem: BH Overarching Goals  Goal: Adheres to Safety Considerations for Self and Others  Outcome: Ongoing (interventions implemented as appropriate)  Goal: Optimized Coping Skills in Response to Life Stressors  Outcome: Ongoing (interventions implemented as appropriate)  Goal: Develops/Participates in Therapeutic [place] to Support Successful Transition  Outcome: Ongoing (interventions implemented as appropriate)    Problem: Manic Behavior/Episode (Adult)  Goal: Mood Equilibrium  Outcome: Ongoing (interventions implemented as appropriate)

## 2017-07-15 NOTE — PLAN OF CARE
Problem:  Patient Care Overview (Adult)  Goal: Plan of Care Review  Outcome: Ongoing (interventions implemented as appropriate)  Rated anxiety and depression as 10. Denies hallucinations, paranoia and S.I. Anticipates discharge on Monday. Has been aggravated by another intrusive patient on unit. Is needy and approaches staff often to tell on other patients or to make requests.    07/15/17 0635   Coping/Psychosocial Response Interventions   Plan Of Care Reviewed With patient   Coping/Psychosocial   Patient Agreement with Plan of Care agrees   Patient Care Overview   Progress no change         Problem:  Overarching Goals  Goal: Adheres to Safety Considerations for Self and Others  Outcome: Ongoing (interventions implemented as appropriate)  Goal: Optimized Coping Skills in Response to Life Stressors  Outcome: Ongoing (interventions implemented as appropriate)  Goal: Develops/Participates in Therapeutic [place] to Support Successful Transition  Outcome: Ongoing (interventions implemented as appropriate)

## 2017-07-15 NOTE — PROGRESS NOTES
"      PROGRESS NOTE  Clinician: Jonah Carlton MD  Admission Date: 7/10/2017  10:18 AM 07/15/17    Behavioral Health Treatment Plan and Problem List: I have reviewed and approved the Behavioral Health Treatment Plan and Problem list.    Allergies  No Known Allergies    Hospital Day: 5 days      Assessment completed within view of staff    History  CC: inpatient followup  Interval HPI: Patient seen and evaluated by me.  Chart reviewed. She expresses aggravation with another patient on the unit.  Denies AVH.  Anxiety elevated this morning.  Patient says she is tolerating her meds okay.      Interval Review of Systems:   General ROS: negative for - fever or malaise  Endocrine ROS: negative for - palpitations  Respiratory ROS: no cough, shortness of breath, or wheezing  Cardiovascular ROS: no chest pain or dyspnea on exertion  Gastrointestinal ROS: no abdominal pain,no black or bloody stools    /80 (BP Location: Left arm, Patient Position: Lying)  Pulse 82  Temp 97.5 °F (36.4 °C) (Temporal Artery )   Resp 18  Ht 65\" (165.1 cm)  Wt 200 lb 14 oz (91.1 kg)  SpO2 98%  BMI 33.43 kg/m2    Mental Status Exam  Mood/Affect:  anxious  Thought Processes:  associations intact  Thought Content:  paranoid ideation  Suicidal Thoughts:  none  Suicidal Plan/Intent: none  Homicidal Thoughts:  absent        Medical Decision Making:   Labs:     Lab Results (last 24 hours)     ** No results found for the last 24 hours. **            Radiology:     Imaging Results (last 24 hours)     ** No results found for the last 24 hours. **            EKG:     ECG/EMG Results (most recent)     None           Medications:     nicotine 1 patch Transdermal Q24H   QUEtiapine 200 mg Oral Daily   QUEtiapine 400 mg Oral Nightly          All medications reviewed.    Special Precautions: Continue current level of Special Precautions.    Assessment/Diagnosis:   Patient Active Problem List   Diagnosis Code   • Bipolar 1 disorder, manic, mild F31.11 "     Treatment Plan: Continue current treatment plan    Attestation:   Physician Attestation: I attest that the above note accurately reflects work and decisions made by me.

## 2017-07-16 PROCEDURE — 99232 SBSQ HOSP IP/OBS MODERATE 35: CPT | Performed by: PSYCHIATRY & NEUROLOGY

## 2017-07-16 RX ORDER — QUETIAPINE FUMARATE 25 MG/1
25 TABLET, FILM COATED ORAL ONCE
Status: COMPLETED | OUTPATIENT
Start: 2017-07-16 | End: 2017-07-16

## 2017-07-16 RX ADMIN — IBUPROFEN 600 MG: 400 TABLET ORAL at 19:53

## 2017-07-16 RX ADMIN — TRAZODONE HYDROCHLORIDE 50 MG: 50 TABLET ORAL at 21:49

## 2017-07-16 RX ADMIN — HYDROXYZINE HYDROCHLORIDE 50 MG: 50 TABLET ORAL at 23:48

## 2017-07-16 RX ADMIN — HYDROXYZINE HYDROCHLORIDE 50 MG: 50 TABLET ORAL at 11:12

## 2017-07-16 RX ADMIN — HYDROXYZINE HYDROCHLORIDE 50 MG: 50 TABLET ORAL at 17:21

## 2017-07-16 RX ADMIN — QUETIAPINE FUMARATE 400 MG: 300 TABLET ORAL at 21:49

## 2017-07-16 RX ADMIN — IBUPROFEN 600 MG: 400 TABLET ORAL at 05:23

## 2017-07-16 RX ADMIN — HYDROXYZINE HYDROCHLORIDE 50 MG: 50 TABLET ORAL at 05:13

## 2017-07-16 RX ADMIN — NICOTINE 1 PATCH: 21 PATCH TRANSDERMAL at 08:58

## 2017-07-16 RX ADMIN — QUETIAPINE FUMARATE 25 MG: 25 TABLET, FILM COATED ORAL at 17:42

## 2017-07-16 RX ADMIN — QUETIAPINE FUMARATE 25 MG: 25 TABLET, FILM COATED ORAL at 15:42

## 2017-07-16 RX ADMIN — QUETIAPINE FUMARATE 200 MG: 100 TABLET ORAL at 08:57

## 2017-07-16 NOTE — PLAN OF CARE
Problem: BH Patient Care Overview (Adult)  Goal: Plan of Care Review  Outcome: Ongoing (interventions implemented as appropriate)  Rates anxiety 10 and depression 0. Denies suicidal thoughts. Patient concerned about other patients and staff doing things to bothering her.   Goal: Interdisciplinary Rounds/Family Conference  Outcome: Ongoing (interventions implemented as appropriate)  Goal: Individualization and Mutuality  Outcome: Ongoing (interventions implemented as appropriate)  Goal: Discharge Needs Assessment  Outcome: Ongoing (interventions implemented as appropriate)    Problem:  Overarching Goals  Goal: Adheres to Safety Considerations for Self and Others  Outcome: Ongoing (interventions implemented as appropriate)  Goal: Optimized Coping Skills in Response to Life Stressors  Outcome: Ongoing (interventions implemented as appropriate)  Goal: Develops/Participates in Therapeutic [place] to Support Successful Transition  Outcome: Ongoing (interventions implemented as appropriate)    Problem: Manic Behavior/Episode (Adult)  Goal: Identify Related Risk Factors and Signs and Symptoms  Outcome: Ongoing (interventions implemented as appropriate)  Goal: Mood Equilibrium  Outcome: Ongoing (interventions implemented as appropriate)  Goal: Impulse Control  Outcome: Ongoing (interventions implemented as appropriate)

## 2017-07-16 NOTE — PLAN OF CARE
Problem:  Patient Care Overview (Adult)  Goal: Plan of Care Review  Outcome: Ongoing (interventions implemented as appropriate)  Rates anxiety as 10 and depression as 0. Denies S.I., hallucinations or paranoia. However, she seems to believe others are watching her or aggravating her a good amount of time. She does come to staff often and report what other patients are doing. Has been cooperative. Slept approx. 6 hours this shift.    07/16/17 0533   Coping/Psychosocial Response Interventions   Plan Of Care Reviewed With patient   Coping/Psychosocial   Patient Agreement with Plan of Care agrees   Patient Care Overview   Progress no change        Problem:  Overarching Goals  Goal: Adheres to Safety Considerations for Self and Others  Outcome: Ongoing (interventions implemented as appropriate)  Goal: Optimized Coping Skills in Response to Life Stressors  Outcome: Ongoing (interventions implemented as appropriate)  Goal: Develops/Participates in Therapeutic [place] to Support Successful Transition  Outcome: Ongoing (interventions implemented as appropriate)

## 2017-07-16 NOTE — PROGRESS NOTES
"      PROGRESS NOTE  Clinician: Jonah Carlton MD  Admission Date: 7/10/2017  11:05 AM 07/16/17    Behavioral Health Treatment Plan and Problem List: I have reviewed and approved the Behavioral Health Treatment Plan and Problem list.    Allergies  No Known Allergies    Hospital Day: 6 days      Assessment completed within view of staff    History  CC: inpatient followup  Interval HPI: Patient seen and evaluated by me.  Chart reviewed. Today is her Birthday.  Denies SI, AVH.  ROS negative as below. Tolerating meds okay. Staff reports that she has continued to demonstrate some signs of paranoia on the unit.    Interval Review of Systems:   General ROS: negative for - fever or malaise  Endocrine ROS: negative for - palpitations  Respiratory ROS: no cough, shortness of breath, or wheezing  Cardiovascular ROS: no chest pain or dyspnea on exertion  Gastrointestinal ROS: no abdominal pain,no black or bloody stools    /83 (BP Location: Left arm, Patient Position: Lying)  Pulse 101  Temp 97.5 °F (36.4 °C) (Temporal Artery )   Resp 18  Ht 65\" (165.1 cm)  Wt 200 lb 14 oz (91.1 kg)  SpO2 98%  BMI 33.43 kg/m2    Mental Status Exam  Mood/Affect: slightly anxious  Thought Processes:  linear, logical, and goal directed  Thought Content:  paranoid ideation  Suicidal Thoughts:  none  Suicidal Plan/Intent: none  Homicidal Thoughts:  absent        Medical Decision Making:   Labs:     Lab Results (last 24 hours)     ** No results found for the last 24 hours. **            Radiology:     Imaging Results (last 24 hours)     ** No results found for the last 24 hours. **            EKG:     ECG/EMG Results (most recent)     None           Medications:     nicotine 1 patch Transdermal Q24H   QUEtiapine 200 mg Oral Daily   QUEtiapine 400 mg Oral Nightly          All medications reviewed.    Special Precautions: Continue current level of Special Precautions.    Assessment/Diagnosis:   Patient Active Problem List   Diagnosis Code "   • Bipolar 1 disorder, manic, mild F31.11     Treatment Plan: Continue current treatment plan.  Continue Seroquel 200/400mg.    Attestation:   Physician Attestation: I attest that the above note accurately reflects work and decisions made by me.

## 2017-07-17 PROCEDURE — 25010000002 ZIPRASIDONE MESYLATE PER 10 MG

## 2017-07-17 PROCEDURE — 25010000002 LORAZEPAM PER 2 MG

## 2017-07-17 RX ORDER — LORAZEPAM 2 MG/ML
INJECTION INTRAMUSCULAR
Status: COMPLETED
Start: 2017-07-17 | End: 2017-07-17

## 2017-07-17 RX ORDER — LORAZEPAM 2 MG/ML
2 INJECTION INTRAMUSCULAR ONCE
Status: COMPLETED | OUTPATIENT
Start: 2017-07-17 | End: 2017-07-17

## 2017-07-17 RX ORDER — QUETIAPINE FUMARATE 100 MG/1
100 TABLET, FILM COATED ORAL ONCE
Status: COMPLETED | OUTPATIENT
Start: 2017-07-17 | End: 2017-07-17

## 2017-07-17 RX ORDER — ZIPRASIDONE MESYLATE 20 MG/ML
10 INJECTION, POWDER, LYOPHILIZED, FOR SOLUTION INTRAMUSCULAR ONCE
Status: COMPLETED | OUTPATIENT
Start: 2017-07-17 | End: 2017-07-17

## 2017-07-17 RX ORDER — ZIPRASIDONE MESYLATE 20 MG/ML
INJECTION, POWDER, LYOPHILIZED, FOR SOLUTION INTRAMUSCULAR
Status: COMPLETED
Start: 2017-07-17 | End: 2017-07-17

## 2017-07-17 RX ORDER — WATER 1000 ML/1000ML
INJECTION, SOLUTION INTRAVENOUS
Status: DISPENSED
Start: 2017-07-17 | End: 2017-07-17

## 2017-07-17 RX ADMIN — ZIPRASIDONE MESYLATE 10 MG: 20 INJECTION, POWDER, LYOPHILIZED, FOR SOLUTION INTRAMUSCULAR at 18:24

## 2017-07-17 RX ADMIN — ZIPRASIDONE MESYLATE 10 MG: 20 INJECTION, POWDER, LYOPHILIZED, FOR SOLUTION INTRAMUSCULAR at 08:49

## 2017-07-17 RX ADMIN — IBUPROFEN 600 MG: 400 TABLET ORAL at 13:43

## 2017-07-17 RX ADMIN — NICOTINE 1 PATCH: 21 PATCH TRANSDERMAL at 08:27

## 2017-07-17 RX ADMIN — QUETIAPINE FUMARATE 100 MG: 100 TABLET ORAL at 14:45

## 2017-07-17 RX ADMIN — QUETIAPINE FUMARATE 200 MG: 100 TABLET ORAL at 08:27

## 2017-07-17 RX ADMIN — LORAZEPAM 2 MG: 2 INJECTION INTRAMUSCULAR at 17:17

## 2017-07-17 RX ADMIN — HYDROXYZINE HYDROCHLORIDE 50 MG: 50 TABLET ORAL at 06:34

## 2017-07-17 RX ADMIN — LORAZEPAM 2 MG: 2 INJECTION INTRAMUSCULAR; INTRAVENOUS at 17:17

## 2017-07-17 RX ADMIN — IBUPROFEN 600 MG: 400 TABLET ORAL at 05:31

## 2017-07-17 NOTE — PROGRESS NOTES
LOS: 7 days   Patient Care Team:  Karan Purcell MD as PCP - General (Family Medicine)    Chief Complaint: Patient complains of a pedro 6 foot tall on the unit who came around her dangling like a hog.       Interval History: She has been in restraints for 2 hours this morning and has received a Geodon injection 10 mg IM            Vital Signs    Temp:  [97.4 °F (36.3 °C)-97.7 °F (36.5 °C)] 97.6 °F (36.4 °C)  Heart Rate:  [104-106] 106  Resp:  [18-20] 18  BP: ()/(66-87) 99/66    Lab Results:   Lab Results (last 24 hours)     ** No results found for the last 24 hours. **           Labs:     Lab Results (last 24 hours)     ** No results found for the last 24 hours. **                        Exam:  Completed:  completed within view of staff  Mental Status Exam:     Hygiene:   fair  Cooperation:  Cooperative  Eye Contact:  Good  Psychomotor Behavior:  Appropriate  Affect:  Appropriate  Speech:  Rambling  Thought Progress:  Disorganized  Thought Content:  Mood congurent  Suicidal:  None  Homicidal:  None  Hallucinations:  None  Delusion:  Paranoid  Memory:  Intact  Orientation:  Person, Place and Time  Reliability:  fair  Insight:  Poor  Judgement:  Impaired  Impulse Control:  Fair      Results Review:    Lab Results (last 24 hours)     ** No results found for the last 24 hours. **          Medication Review:  Hospital Medications (active)       Dose Frequency Start End    aluminum-magnesium hydroxide-simethicone (MAALOX MAX) 400-400-40 MG/5ML suspension 15 mL 15 mL Every 6 Hours PRN 7/10/2017     Sig - Route: Take 15 mL by mouth Every 6 (Six) Hours As Needed for Indigestion or Heartburn. - Oral    benzonatate (TESSALON) capsule 100 mg 100 mg 3 Times Daily PRN 7/10/2017     Sig - Route: Take 1 capsule by mouth 3 (Three) Times a Day As Needed for Cough. - Oral    famotidine (PEPCID) tablet 20 mg 20 mg 2 Times Daily PRN 7/10/2017     Sig - Route: Take 1 tablet by mouth 2 (Two) Times a Day As Needed for  Heartburn. - Oral    hydrOXYzine (ATARAX) tablet 50 mg 50 mg Every 6 Hours PRN 7/10/2017     Sig - Route: Take 1 tablet by mouth Every 6 (Six) Hours As Needed for Anxiety. - Oral    ibuprofen (ADVIL,MOTRIN) tablet 600 mg 600 mg Every 6 Hours PRN 7/10/2017     Sig - Route: Take 1.5 tablets by mouth Every 6 (Six) Hours As Needed for Mild Pain (1-3) or Moderate Pain  (severe pain (7-10)). - Oral    loperamide (IMODIUM) capsule 2 mg 2 mg 4 Times Daily PRN 7/10/2017     Sig - Route: Take 1 capsule by mouth 4 (Four) Times a Day As Needed for Diarrhea. - Oral    magnesium hydroxide (MILK OF MAGNESIA) suspension 2400 mg/10mL 10 mL 10 mL Daily PRN 7/10/2017     Sig - Route: Take 10 mL by mouth Daily As Needed for Constipation. - Oral    nicotine (NICODERM CQ) 21 MG/24HR patch 1 patch 1 patch Every 24 Hours Scheduled 7/10/2017     Sig - Route: Place 1 patch on the skin Daily. - Transdermal    ondansetron (ZOFRAN) tablet 4 mg 4 mg Every 6 Hours PRN 7/10/2017     Sig - Route: Take 1 tablet by mouth Every 6 (Six) Hours As Needed for Nausea or Vomiting. - Oral    pneumococcal polysaccharide 23-valent (PNEUMOVAX-23) vaccine 0.5 mL 0.5 mL During Hospitalization 7/10/2017     Sig - Route: Inject 0.5 mL into the shoulder, thigh, or buttocks During Hospitalization for Immunization. - Intramuscular    Cosign for Ordering: Accepted by Tavo Bustillo MD on 7/12/2017  2:47 PM    QUEtiapine (SEROquel) tablet 200 mg 200 mg Daily 7/15/2017     Sig - Route: Take 2 tablets by mouth Daily. - Oral    QUEtiapine (SEROquel) tablet 25 mg 25 mg Once 7/16/2017 7/16/2017    Sig - Route: Take 1 tablet by mouth 1 (One) Time. - Oral    QUEtiapine (SEROquel) tablet 25 mg 25 mg Once 7/16/2017 7/16/2017    Sig - Route: Take 1 tablet by mouth 1 (One) Time. - Oral    QUEtiapine (SEROquel) tablet 400 mg 400 mg Nightly 7/13/2017     Sig - Route: Take 400 mg by mouth Every Night. - Oral    sodium chloride (OCEAN) nasal spray 2 spray 2 spray As Needed  7/10/2017     Sig - Route: 2 sprays by Each Nare route As Needed for Congestion. - Each Nare    sterile water (preservative free) injection  - ADS Override Pull   7/17/2017 7/17/2017    Notes to Pharmacy: Created by cabinet override    traZODone (DESYREL) tablet 50 mg 50 mg Nightly PRN 7/10/2017     Sig - Route: Take 1 tablet by mouth At Night As Needed for Sleep. - Oral    ziprasidone (GEODON) injection 10 mg 10 mg Once 7/17/2017 7/17/2017    Sig - Route: Inject 10 mg into the shoulder, thigh, or buttocks 1 (One) Time. - Intramuscular           Special Precautions Level: III        Assessment/Plan     Assessment: Assessment: Psychosis NOS  Bipolar disorder hypomanic with psychosis      Treatment Plan: Will. keep on Geodon 10 mg IM every 6 hours when necessary for agitation      Treatment Plan discussed with: Patient    I have reviewed and approved the behavioral health treatment plans and problem list. Yes        Tavo Bustillo MD  07/17/17  10:39 AM

## 2017-07-17 NOTE — NURSING NOTE
Patient is very verbally abusive to staff and trying to tell other patients on unit what to do.  Came to nursing stations and started beating on desk, coming behind nursing stations and demanding something to color with (she was marking on day room christopher earlier).  Dr. Bustillo notified of negative behaviors and not following staff redirections.  Seroquel 100mg po ordered now with order repeated back to Dr. Bustillo x2.

## 2017-07-17 NOTE — NURSING NOTE
Dr. Bustillo contacted about patients behaviors and threats and patient having to be restrained at 7am.  Gave order for restraints.

## 2017-07-17 NOTE — NURSING NOTE
Patient still in restraint room screaming, broke leg restraints from kicking feet and had to have another pair reapplied.  No circulatory impairments noted but is still very agitated and yelling like an , sitting up on restraint bed and not listening to staff.  1:1 monitoring with staff.

## 2017-07-17 NOTE — NURSING NOTE
Face to Face Evaluation for Restraint/Seculsion    Behavior Observed: Patient continues to be yelling and saying that she is going to hurt someone.  Calling staff F---ing cunts.  Telling staff that she is going to rip those inappropriate clothing off of them.  Patient refused vital signs.  Is alert but very flight of ideas.  Contacted Dr. Bustillo with findings in agreement restraint to continue.  Recommendations: Continue restraint.  DC criteria not met.    Melanie Ramirez RN  07/17/17  8:46 AM

## 2017-07-17 NOTE — NURSING NOTE
Patient was in dye and thought another patient had her shirt on and tried to remove it but redirected easily because staff was right there.  Taken back to her room and continues to curse at staff but re-directioning easily.

## 2017-07-17 NOTE — NURSING NOTE
Patient in restraint room, cursing called nurse laverne padilla and slut, fighting with restraints, patient did take few sips of water, no circulatory impairments noted, sitting up and yelling at SP1 sitter.

## 2017-07-17 NOTE — PLAN OF CARE
Problem: BH Overarching Goals  Goal: Adheres to Safety Considerations for Self and Others  Outcome: Ongoing (interventions implemented as appropriate)  Goal: Optimized Coping Skills in Response to Life Stressors  Outcome: Ongoing (interventions implemented as appropriate)  Goal: Develops/Participates in Therapeutic [place] to Support Successful Transition  Outcome: Ongoing (interventions implemented as appropriate)

## 2017-07-17 NOTE — PLAN OF CARE
Problem:  Patient Care Overview (Adult)  Goal: Plan of Care Review  Outcome: Ongoing (interventions implemented as appropriate)  Pt. Is more obstinate and confrontational. Arguing with patients and staff over anything that is said or done. Feels people are doing things to her or watching her. Misconstrues what is going on and fabricates things about it. Rated anxiety as 10 and depression as 0. Denies S.I. Believes she'll be discharged today. Has her belongings and some  Hospital linen bagged up in her room. Has not slept tonight.    07/17/17 0534   Coping/Psychosocial Response Interventions   Plan Of Care Reviewed With patient   Coping/Psychosocial   Patient Agreement with Plan of Care agrees   Patient Care Overview   Progress declining        Problem:  Overarching Goals  Goal: Adheres to Safety Considerations for Self and Others  Outcome: Ongoing (interventions implemented as appropriate)  Goal: Optimized Coping Skills in Response to Life Stressors  Outcome: Ongoing (interventions implemented as appropriate)  Goal: Develops/Participates in Therapeutic [place] to Support Successful Transition  Outcome: Ongoing (interventions implemented as appropriate)

## 2017-07-17 NOTE — NURSING NOTE
Debriefing done with Dr. Bustillo, Lead Melanie Mckoy RN and decided to release restraints, behavior has improved some and still cursing but not aggressive yet.

## 2017-07-17 NOTE — NURSING NOTE
Patient still cursing and threatening staff, refused water and did not need to go to BR.  No circulatory impairments noted.

## 2017-07-17 NOTE — NURSING NOTE
Spoke with Dr. Bustillo about patient's continued behavior even while in restraints.  New order for Geodon 10mg IM x 1 dose.

## 2017-07-17 NOTE — NURSING NOTE
Face to Face Evaluation for Restraint/Seculsion    Behavior Observed: One hour face to face completed.  Patient continues to yell at the top of her lungs kill,kill, kill and told the [facility] that she was going to find out where she lives and cut her head off and murder her.  Patient does not appear to be oriented to anything but her name.  Findings reported to Dr. Bustillo in agreement for restraint to continue.  She also ordered Geodon 10mg IM one time dose.  Recommendations:  Continue restraints    Melanie Ramirez RN  07/17/17  6:59 PM

## 2017-07-17 NOTE — PLAN OF CARE
Problem:  Patient Care Overview (Adult)  Goal: Interdisciplinary Rounds/Family Conference  Outcome: Ongoing (interventions implemented as appropriate)    07/17/17 1056   Interdisciplinary Rounds/Family Conf   Summary Treatment team staffing    Participants social work;psychiatrist;nursing      1050:      Therapist staffed case with treatment team.  Patient has been delusional today and required retraints and Geodon injection. Continues to curse at staff and instigate arguments with peers. Therapist has spoken with daughter-in-law about need to have written documentation of guardianship. Family has of yet brought documentation. Therapist will continue to assist as needed.

## 2017-07-18 RX ORDER — ZIPRASIDONE HYDROCHLORIDE 20 MG/1
20 CAPSULE ORAL 2 TIMES DAILY WITH MEALS
Status: DISCONTINUED | OUTPATIENT
Start: 2017-07-18 | End: 2017-07-20

## 2017-07-18 RX ADMIN — QUETIAPINE FUMARATE 400 MG: 300 TABLET ORAL at 20:36

## 2017-07-18 RX ADMIN — IBUPROFEN 600 MG: 400 TABLET ORAL at 05:21

## 2017-07-18 RX ADMIN — TRAZODONE HYDROCHLORIDE 50 MG: 50 TABLET ORAL at 21:46

## 2017-07-18 RX ADMIN — ZIPRASIDONE HYDROCHLORIDE 20 MG: 20 CAPSULE ORAL at 12:02

## 2017-07-18 RX ADMIN — QUETIAPINE FUMARATE 200 MG: 100 TABLET ORAL at 08:42

## 2017-07-18 RX ADMIN — NICOTINE 1 PATCH: 21 PATCH TRANSDERMAL at 08:43

## 2017-07-18 RX ADMIN — HYDROXYZINE HYDROCHLORIDE 50 MG: 50 TABLET ORAL at 18:18

## 2017-07-18 RX ADMIN — HYDROXYZINE HYDROCHLORIDE 50 MG: 50 TABLET ORAL at 05:21

## 2017-07-18 RX ADMIN — HYDROXYZINE HYDROCHLORIDE 50 MG: 50 TABLET ORAL at 12:03

## 2017-07-18 RX ADMIN — IBUPROFEN 600 MG: 400 TABLET ORAL at 18:18

## 2017-07-18 RX ADMIN — ZIPRASIDONE HYDROCHLORIDE 20 MG: 20 CAPSULE ORAL at 17:01

## 2017-07-18 RX ADMIN — IBUPROFEN 600 MG: 400 TABLET ORAL at 12:03

## 2017-07-18 NOTE — PLAN OF CARE
Problem:  Patient Care Overview (Adult)  Goal: Plan of Care Review  Outcome: Ongoing (interventions implemented as appropriate)    07/18/17 0449   Coping/Psychosocial Response Interventions   Plan Of Care Reviewed With patient   Coping/Psychosocial   Patient Agreement with Plan of Care agrees   Patient Care Overview   Progress no change   Outcome Evaluation   Outcome Summary/Follow up Plan Patient has been calm and cooperative since discontinuation of restraints. Denies anxiety, depression, SI/HI, or hallucinations.          Problem:  Overarching Goals  Goal: Adheres to Safety Considerations for Self and Others  Outcome: Ongoing (interventions implemented as appropriate)  Goal: Optimized Coping Skills in Response to Life Stressors  Outcome: Ongoing (interventions implemented as appropriate)  Goal: Develops/Participates in Therapeutic [place] to Support Successful Transition  Outcome: Ongoing (interventions implemented as appropriate)

## 2017-07-18 NOTE — PLAN OF CARE
"Problem:  Patient Care Overview (Adult)  Goal: Discharge Needs Assessment  Outcome: Ongoing (interventions implemented as appropriate)    07/11/17 1235 07/18/17 1449   Discharge Needs Assessment   Concerns To Be Addressed mental health concerns;cognitive/perceptual concerns;coping/stress concerns;substance/tobacco abuse/use concerns --    Readmission Within The Last 30 Days --  no previous admission in last 30 days   Community [facility]  --    Current Discharge Risk substance abuse;psychiatric illness --    Discharge Planning Comments Patient has Medicare AB and [place] medicaid for medications. --    Discharge Needs Assessment   Outpatient/Agency/Support Group Needs outpatient medication management;outpatient psychiatric care (specify);outpatient counseling;outpatient substance abuse treatment (specify) --    Anticipated Discharge Disposition home or self-care --    Living Environment   Transportation Available public transportation --    DATA: Patient was observed in the day room this afternoon arguing with a peer and calling a patient names.  Patient was redirected multiple times by nursing staff.  Later met with patient and she told several stories about her daughter in law and about a brother in law.  Patient struggled to stay on topic.  Patient stated that she wanted to kill her daughter in law by punching her in her \"big face.\"  Patient then reported that she knows she cannot kill anyone so she needs to know what would be the appropriate way to handle the situation.  She reported that she discussed with the psychiatrist and with her sisters a plan to take a restraining order on her daughter in law due to all the things that the daughter in law has done to patient.      ASSESSMENT: Patient speech was pressured while meeting with this therapist and she struggled to sit still.  Patient was observed arguing with a peer and calling a patient names, patient as redirected multiple times.  Patient was " also observed pushing items out of her way on the counter at the nurses station and expressing her frustration with nursing staff.  Patient was later observed interacting calmly with other patients in the day room.  Patient continues to struggle with aggressive behaviors with staff and peers.  Patient has had medication changes and will be observed over the next 24 hours for improvements.  Patient is not currently stable for discharge.        PLAN:  Patient is planned to return home upon her stabilization and planned to follow up with [facility] in [place].

## 2017-07-18 NOTE — PLAN OF CARE
Problem: BH Patient Care Overview (Adult)  Goal: Plan of Care Review  Outcome: Ongoing (interventions implemented as appropriate)    07/18/17 1613   Coping/Psychosocial Response Interventions   Plan Of Care Reviewed With patient   Coping/Psychosocial   Patient Agreement with Plan of Care agrees   Patient Care Overview   Progress no change   Outcome Evaluation   Outcome Summary/Follow up Plan Remains argumenitive and frequently seeks out staff . Lots of redirection required. Denies si/hi.         Problem:  Overarching Goals  Goal: Adheres to Safety Considerations for Self and Others  Outcome: Ongoing (interventions implemented as appropriate)  Goal: Optimized Coping Skills in Response to Life Stressors  Outcome: Ongoing (interventions implemented as appropriate)  Goal: Develops/Participates in Therapeutic [place] to Support Successful Transition  Outcome: Ongoing (interventions implemented as appropriate)
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
## 2017-07-18 NOTE — NURSING NOTE
Dr. Bustillo notified of discontinuation of restraints. Head to toe assessment completed; no injuries noted. Patient calm and cooperative at present and request to go to room to eat a snack.

## 2017-07-19 PROCEDURE — 25010000002 LORAZEPAM PER 2 MG

## 2017-07-19 PROCEDURE — 25010000002 DIPHENHYDRAMINE PER 50 MG

## 2017-07-19 PROCEDURE — 25010000002 HALOPERIDOL LACTATE PER 5 MG

## 2017-07-19 PROCEDURE — 93005 ELECTROCARDIOGRAM TRACING: CPT | Performed by: PSYCHIATRY & NEUROLOGY

## 2017-07-19 RX ORDER — DIPHENHYDRAMINE HYDROCHLORIDE 50 MG/ML
INJECTION INTRAMUSCULAR; INTRAVENOUS
Status: COMPLETED
Start: 2017-07-19 | End: 2017-07-19

## 2017-07-19 RX ORDER — DIPHENHYDRAMINE HYDROCHLORIDE 50 MG/ML
50 INJECTION INTRAMUSCULAR; INTRAVENOUS ONCE
Status: COMPLETED | OUTPATIENT
Start: 2017-07-19 | End: 2017-07-19

## 2017-07-19 RX ORDER — HALOPERIDOL 5 MG/ML
INJECTION INTRAMUSCULAR
Status: COMPLETED
Start: 2017-07-19 | End: 2017-07-19

## 2017-07-19 RX ORDER — LORAZEPAM 2 MG/ML
INJECTION INTRAMUSCULAR
Status: COMPLETED
Start: 2017-07-19 | End: 2017-07-19

## 2017-07-19 RX ORDER — LORAZEPAM 2 MG/ML
2 INJECTION INTRAMUSCULAR ONCE
Status: COMPLETED | OUTPATIENT
Start: 2017-07-19 | End: 2017-07-19

## 2017-07-19 RX ORDER — HALOPERIDOL 5 MG/ML
5 INJECTION INTRAMUSCULAR ONCE
Status: COMPLETED | OUTPATIENT
Start: 2017-07-19 | End: 2017-07-19

## 2017-07-19 RX ADMIN — ZIPRASIDONE HYDROCHLORIDE 20 MG: 20 CAPSULE ORAL at 08:24

## 2017-07-19 RX ADMIN — HALOPERIDOL LACTATE 5 MG: 5 INJECTION, SOLUTION INTRAMUSCULAR at 20:53

## 2017-07-19 RX ADMIN — NICOTINE 1 PATCH: 21 PATCH TRANSDERMAL at 08:24

## 2017-07-19 RX ADMIN — LORAZEPAM 2 MG: 2 INJECTION INTRAMUSCULAR at 20:55

## 2017-07-19 RX ADMIN — HYDROXYZINE HYDROCHLORIDE 50 MG: 50 TABLET ORAL at 15:24

## 2017-07-19 RX ADMIN — QUETIAPINE FUMARATE 400 MG: 300 TABLET ORAL at 21:00

## 2017-07-19 RX ADMIN — IBUPROFEN 600 MG: 400 TABLET ORAL at 16:35

## 2017-07-19 RX ADMIN — HYDROXYZINE HYDROCHLORIDE 50 MG: 50 TABLET ORAL at 09:31

## 2017-07-19 RX ADMIN — FAMOTIDINE 20 MG: 20 TABLET ORAL at 05:08

## 2017-07-19 RX ADMIN — HALOPERIDOL 5 MG: 5 INJECTION INTRAMUSCULAR at 20:53

## 2017-07-19 RX ADMIN — DIPHENHYDRAMINE HYDROCHLORIDE 50 MG: 50 INJECTION INTRAMUSCULAR; INTRAVENOUS at 20:55

## 2017-07-19 RX ADMIN — IBUPROFEN 600 MG: 400 TABLET ORAL at 00:23

## 2017-07-19 RX ADMIN — LORAZEPAM 2 MG: 2 INJECTION INTRAMUSCULAR; INTRAVENOUS at 20:55

## 2017-07-19 RX ADMIN — ZIPRASIDONE HYDROCHLORIDE 20 MG: 20 CAPSULE ORAL at 18:26

## 2017-07-19 RX ADMIN — HYDROXYZINE HYDROCHLORIDE 50 MG: 50 TABLET ORAL at 00:23

## 2017-07-19 NOTE — PLAN OF CARE
Problem:  Patient Care Overview (Adult)  Goal: Plan of Care Review  Outcome: Ongoing (interventions implemented as appropriate)    07/19/17 1643   Coping/Psychosocial Response Interventions   Plan Of Care Reviewed With patient   Coping/Psychosocial   Patient Agreement with Plan of Care agrees   Patient Care Overview   Progress no change   Outcome Evaluation   Outcome Summary/Follow up Plan Argues with staff and other patients. Requires much redirection. Rates anxiety and depression a 10. Approaches staff frequent.         Problem:  Overarching Goals  Goal: Adheres to Safety Considerations for Self and Others  Outcome: Ongoing (interventions implemented as appropriate)  Goal: Optimized Coping Skills in Response to Life Stressors  Outcome: Ongoing (interventions implemented as appropriate)  Goal: Develops/Participates in Therapeutic [place] to Support Successful Transition  Outcome: Ongoing (interventions implemented as appropriate)

## 2017-07-19 NOTE — PLAN OF CARE
Problem:  Patient Care Overview (Adult)  Goal: Plan of Care Review  Outcome: Ongoing (interventions implemented as appropriate)    07/19/17 0507   Coping/Psychosocial Response Interventions   Plan Of Care Reviewed With patient   Coping/Psychosocial   Patient Agreement with Plan of Care agrees   Patient Care Overview   Progress no change   Outcome Evaluation   Outcome Summary/Follow up Plan Pt continues to be argumentative with staff. Pt required frequent redirection.          Problem:  Overarching Goals  Goal: Adheres to Safety Considerations for Self and Others  Outcome: Ongoing (interventions implemented as appropriate)  Goal: Optimized Coping Skills in Response to Life Stressors  Outcome: Ongoing (interventions implemented as appropriate)  Goal: Develops/Participates in Therapeutic [place] to Support Successful Transition  Outcome: Ongoing (interventions implemented as appropriate)
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
## 2017-07-19 NOTE — NURSING NOTE
"ASSESSMENT REPORT GIVEN TO ROSS COLEMAN RN. CLIENT STATES I WANT AN ATIVAN SHOT BEFORE I GO TO BED. YOU NEED TO GET ME ONE ORDERED.\" TEACHING DONE ABOUT MEDICATION REGIME. CLIENT ACKNOWLEDGES VERBAL UNDERSTANDING OF MEDICATIONS. CLIENT IS TALKATIVE .  "

## 2017-07-19 NOTE — NURSING NOTE
"Patient hasn't slept any this shift. Patient remains manic; pacing the dayroom while talking to self. Patient dressing inappropriately. Patient is preoccupied with getting \"geodon and ativan\" prescribed to take home with her. Patient very argumentative with staff. Patient required frequently redirection this shift.   "

## 2017-07-20 RX ORDER — ZIPRASIDONE HYDROCHLORIDE 40 MG/1
40 CAPSULE ORAL 2 TIMES DAILY WITH MEALS
Status: DISCONTINUED | OUTPATIENT
Start: 2017-07-20 | End: 2017-07-21 | Stop reason: HOSPADM

## 2017-07-20 RX ADMIN — IBUPROFEN 600 MG: 400 TABLET ORAL at 15:47

## 2017-07-20 RX ADMIN — HYDROXYZINE HYDROCHLORIDE 50 MG: 50 TABLET ORAL at 13:51

## 2017-07-20 RX ADMIN — QUETIAPINE FUMARATE 400 MG: 300 TABLET ORAL at 20:11

## 2017-07-20 RX ADMIN — ZIPRASIDONE HYDROCHLORIDE 40 MG: 40 CAPSULE ORAL at 17:22

## 2017-07-20 RX ADMIN — NICOTINE 1 PATCH: 21 PATCH TRANSDERMAL at 08:02

## 2017-07-20 RX ADMIN — HYDROXYZINE HYDROCHLORIDE 50 MG: 50 TABLET ORAL at 08:05

## 2017-07-20 RX ADMIN — FAMOTIDINE 20 MG: 20 TABLET ORAL at 17:22

## 2017-07-20 RX ADMIN — ZIPRASIDONE HYDROCHLORIDE 20 MG: 20 CAPSULE ORAL at 08:02

## 2017-07-20 RX ADMIN — IBUPROFEN 600 MG: 400 TABLET ORAL at 08:05

## 2017-07-20 NOTE — PLAN OF CARE
Problem: BH Patient Care Overview (Adult)  Goal: Plan of Care Review  Outcome: Ongoing (interventions implemented as appropriate)    07/20/17 0353   Coping/Psychosocial Response Interventions   Plan Of Care Reviewed With patient   Coping/Psychosocial   Patient Agreement with Plan of Care agrees   Patient Care Overview   Progress no change   Outcome Evaluation   Outcome Summary/Follow up Plan Patient continues to be argumenative with staff, frequently seeks out staff, and asks for medication frequently. Patient became agitated and irritable and required a PRN injection. (See prior nursing note).          Problem:  Overarching Goals  Goal: Adheres to Safety Considerations for Self and Others  Outcome: Ongoing (interventions implemented as appropriate)  Goal: Optimized Coping Skills in Response to Life Stressors  Outcome: Ongoing (interventions implemented as appropriate)  Goal: Develops/Participates in Therapeutic [place] to Support Successful Transition  Outcome: Ongoing (interventions implemented as appropriate)

## 2017-07-20 NOTE — PLAN OF CARE
Problem:  Patient Care Overview (Adult)  Goal: Interdisciplinary Rounds/Family Conference  Outcome: Ongoing (interventions implemented as appropriate)    07/20/17 1345   Interdisciplinary Rounds/Family Conf   Summary Spoke with Ashley and Alicia patients sister at the request of Dr. Bustillo   Participants family;social work;psychiatrist      DATA: Contacted Dr. Bustillo who requested that this therapist contact patients sisters.  Patients sister's reported concerns that patient has not been able to get adequate sleep and patient is not showing improvement.  Patients sisters reported that patients symptoms have increased with addition of seroquel.  Patients sisters report that when patient is hospitalized that patient is usually given ativan to help her get sleep and then patient usually shows improvement.  Patients sisters report that patients daughter in law and son do not have guardianship.  Patients sisters reported that patient will engage in substance use due to the people who are living [place] encourage patient and so patient engages.  Patients sisters report that patient will then not take the medication she needs to take and then becomes manic.  Patients sisters fear that patient continues to digress and show no improvement since her hospitalization here.  Patients sisters also report that patient has been paranoid and patient has reported to them that people here are out to get her.    Met with patient who was requesting to leave against medical advice.  She reported that the Geodon and the Seroquel are giving her energy.  She reported that she knows how to deal with these psychiatrists because she has been doing it for many years.  She discussed using reverse psychology.  She discussed how her sisters are missing her and want her to come home.  She reported that she came here voluntarily and she thought she would be going home with some men but she will be going home alone.  She reported that  the Seroquel will not let her sleep and so she goes to lay down but gets right back up.  She reported that she would be asking the nurse to contact the doctor so she can go home.     ASSESSMENT:  Patient remains manic, intrusive and agitated. Patients speech is pressured. Patient is currently unsafe for discharge and requires ongoing hospitalization for stabilization of symptoms.     PLAN:  Patient will continue stabilization and medication adjustments to stabilize symptoms.

## 2017-07-20 NOTE — NURSING NOTE
Patient pacing the unit cursing staff and patients. Patient argumentative and demanding. Staff attempted to redirect patient with no success. Patient's agitation escalated. On-call MD, Dr. Austin, notified with new orders noted and carried through.

## 2017-07-20 NOTE — PROGRESS NOTES
LOS: 10 days   Patient Care Team:  Karan Purcell MD as PCP - General (Family Medicine)    Chief Complaint: Patient has no complaints today she says she wants to go home.  She says she is tired of being here, everybody's bugging her, people are wanting relationships with her, she wants to go home.. She does say today that her daughter-in-law has multiple sclerosis and she is dying.      Interval History: Patient has required when necessary injection last night of Haldol and Ativan.  I discussed this with the patient, she says she did not do anything, she doesn't know why because security, she says she is tired of all this she wants to go home.  She has 2 numbers to call Ashley at [phone] Alicia at [phone].    On asking she says she does not like Haldol she prefers Geodon.,  I will check with the therapist to see if her son and daughter-in-law visited last night.        Vital Signs    Temp:  [97.2 °F (36.2 °C)-98.4 °F (36.9 °C)] 97.2 °F (36.2 °C)  Heart Rate:  [91] 91  Resp:  [18] 18  BP: (125-142)/(71-78) 125/71    Lab Results:   Lab Results (last 24 hours)     ** No results found for the last 24 hours. **           Labs:     Lab Results (last 24 hours)     ** No results found for the last 24 hours. **                        Exam:  Completed:  completed within view of staff  Mental Status Exam:     Hygiene:   good  Cooperation:  Cooperative  Eye Contact:  Good  Psychomotor Behavior:  Appropriate  Affect:  Appropriate  Speech:  Normal  Thought Progress:  Goal directed  Thought Content:  Mood congurent  Suicidal:  None  Homicidal:  None  Hallucinations:  None  Delusion:  None patient may be delusional about daughter-in-law's diagnosis of multiple sclerosis  Memory:  Intact  Orientation:  Person, Place and Time  Reliability:  fair  Insight:  Poor  Judgement:  Poor  Impulse Control:  Poor      Results Review:    Lab Results (last 24 hours)     ** No results found for the last 24 hours. **          Medication  Review:  Hospital Medications (active)       Dose Frequency Start End    aluminum-magnesium hydroxide-simethicone (MAALOX MAX) 400-400-40 MG/5ML suspension 15 mL 15 mL Every 6 Hours PRN 7/10/2017     Sig - Route: Take 15 mL by mouth Every 6 (Six) Hours As Needed for Indigestion or Heartburn. - Oral    benzonatate (TESSALON) capsule 100 mg 100 mg 3 Times Daily PRN 7/10/2017     Sig - Route: Take 1 capsule by mouth 3 (Three) Times a Day As Needed for Cough. - Oral    diphenhydrAMINE (BENADRYL) injection 50 mg 50 mg Once 7/19/2017 7/19/2017    Sig - Route: Inject 1 mL into the shoulder, thigh, or buttocks 1 (One) Time. - Intramuscular    famotidine (PEPCID) tablet 20 mg 20 mg 2 Times Daily PRN 7/10/2017     Sig - Route: Take 1 tablet by mouth 2 (Two) Times a Day As Needed for Heartburn. - Oral    haloperidol lactate (HALDOL) injection 5 mg 5 mg Once 7/19/2017 7/19/2017    Sig - Route: Inject 1 mL into the shoulder, thigh, or buttocks 1 (One) Time. - Intramuscular    hydrOXYzine (ATARAX) tablet 50 mg 50 mg Every 6 Hours PRN 7/10/2017     Sig - Route: Take 1 tablet by mouth Every 6 (Six) Hours As Needed for Anxiety. - Oral    ibuprofen (ADVIL,MOTRIN) tablet 600 mg 600 mg Every 6 Hours PRN 7/10/2017     Sig - Route: Take 1.5 tablets by mouth Every 6 (Six) Hours As Needed for Mild Pain  or Moderate Pain  (severe pain (7-10)). - Oral    loperamide (IMODIUM) capsule 2 mg 2 mg 4 Times Daily PRN 7/10/2017     Sig - Route: Take 1 capsule by mouth 4 (Four) Times a Day As Needed for Diarrhea. - Oral    LORazepam (ATIVAN) injection 2 mg 2 mg Once 7/19/2017 7/19/2017    Sig - Route: Inject 1 mL into the shoulder, thigh, or buttocks 1 (One) Time. - Intramuscular    magnesium hydroxide (MILK OF MAGNESIA) suspension 2400 mg/10mL 10 mL 10 mL Daily PRN 7/10/2017     Sig - Route: Take 10 mL by mouth Daily As Needed for Constipation. - Oral    nicotine (NICODERM CQ) 21 MG/24HR patch 1 patch 1 patch Every 24 Hours Scheduled 7/10/2017      Sig - Route: Place 1 patch on the skin Daily. - Transdermal    ondansetron (ZOFRAN) tablet 4 mg 4 mg Every 6 Hours PRN 7/10/2017     Sig - Route: Take 1 tablet by mouth Every 6 (Six) Hours As Needed for Nausea or Vomiting. - Oral    pneumococcal polysaccharide 23-valent (PNEUMOVAX-23) vaccine 0.5 mL 0.5 mL During Hospitalization 7/10/2017     Sig - Route: Inject 0.5 mL into the shoulder, thigh, or buttocks During Hospitalization for Immunization. - Intramuscular    Cosign for Ordering: Accepted by Tavo Bustillo MD on 7/12/2017  2:47 PM    QUEtiapine (SEROquel) tablet 400 mg 400 mg Nightly 7/13/2017     Sig - Route: Take 400 mg by mouth Every Night. - Oral    sodium chloride (OCEAN) nasal spray 2 spray 2 spray As Needed 7/10/2017     Sig - Route: 2 sprays by Each Nare route As Needed for Congestion. - Each Nare    traZODone (DESYREL) tablet 50 mg 50 mg Nightly PRN 7/10/2017     Sig - Route: Take 1 tablet by mouth At Night As Needed for Sleep. - Oral    ziprasidone (GEODON) capsule 20 mg 20 mg 2 Times Daily With Meals 7/18/2017     Sig - Route: Take 1 capsule by mouth 2 (Two) Times a Day With Meals. - Oral           Special Precautions Level: IV            Assessment:    Assessment: Assessment: Assessment: Assessment: Psychosis NOS  Bipolar disorder hypomanic with psychosis      Treatment Plan:Increase Geodon to 40 mg twice a day      Treatment Plan discussed with: Patient    I have reviewed and approved the behavioral health treatment plans and problem list. Yes  Addendum I was informed by nurse Teresa Holcomb that patient's daughter-in-law had requested for me to call her at [phone].  I talked to patient about this, she does not give me permission to call her.    Tavo Bustillo MD  07/20/17  10:10 AM

## 2017-07-21 VITALS
DIASTOLIC BLOOD PRESSURE: 82 MMHG | WEIGHT: 200.88 LBS | SYSTOLIC BLOOD PRESSURE: 133 MMHG | OXYGEN SATURATION: 95 % | BODY MASS INDEX: 33.47 KG/M2 | HEIGHT: 65 IN | HEART RATE: 105 BPM | TEMPERATURE: 97.8 F | RESPIRATION RATE: 18 BRPM

## 2017-07-21 RX ORDER — HYDROXYZINE 50 MG/1
50 TABLET, FILM COATED ORAL DAILY PRN
Qty: 15 TABLET | Refills: 0 | Status: SHIPPED | OUTPATIENT
Start: 2017-07-21 | End: 2017-08-05

## 2017-07-21 RX ORDER — QUETIAPINE FUMARATE 400 MG/1
400 TABLET, FILM COATED ORAL NIGHTLY
Qty: 15 TABLET | Refills: 0 | Status: SHIPPED | OUTPATIENT
Start: 2017-07-21 | End: 2017-08-05

## 2017-07-21 RX ORDER — ZIPRASIDONE HYDROCHLORIDE 40 MG/1
40 CAPSULE ORAL 2 TIMES DAILY WITH MEALS
Qty: 30 CAPSULE | Refills: 0 | Status: SHIPPED | OUTPATIENT
Start: 2017-07-21 | End: 2017-08-05

## 2017-07-21 RX ORDER — PRAZOSIN HYDROCHLORIDE 1 MG/1
1 CAPSULE ORAL NIGHTLY
Qty: 15 CAPSULE | Refills: 0 | Status: SHIPPED | OUTPATIENT
Start: 2017-07-21 | End: 2017-08-05

## 2017-07-21 RX ORDER — PRAZOSIN HYDROCHLORIDE 1 MG/1
1 CAPSULE ORAL NIGHTLY
Status: DISCONTINUED | OUTPATIENT
Start: 2017-07-21 | End: 2017-07-21 | Stop reason: HOSPADM

## 2017-07-21 RX ADMIN — HYDROXYZINE HYDROCHLORIDE 50 MG: 50 TABLET ORAL at 13:15

## 2017-07-21 RX ADMIN — HYDROXYZINE HYDROCHLORIDE 50 MG: 50 TABLET ORAL at 02:28

## 2017-07-21 RX ADMIN — FAMOTIDINE 20 MG: 20 TABLET ORAL at 10:44

## 2017-07-21 RX ADMIN — NICOTINE 1 PATCH: 21 PATCH TRANSDERMAL at 08:09

## 2017-07-21 RX ADMIN — IBUPROFEN 600 MG: 400 TABLET ORAL at 02:27

## 2017-07-21 RX ADMIN — IBUPROFEN 600 MG: 400 TABLET ORAL at 13:15

## 2017-07-21 RX ADMIN — ZIPRASIDONE HYDROCHLORIDE 40 MG: 40 CAPSULE ORAL at 08:09

## 2017-07-21 NOTE — PLAN OF CARE
"Problem:  Patient Care Overview (Adult)  Goal: Interdisciplinary Rounds/Family Conference  Outcome: Ongoing (interventions implemented as appropriate)    07/21/17 1022   Interdisciplinary Rounds/Family Conf   Summary Contacted patients sister Ashley after speaking with Dr. Bustillo   Participants family;social work;psychiatrist;nursing      DATA: Spoke with Dr. Bustillo who reported that patient is requesting to go home and appears stable for discharge.  Contacted patients sister Ashley to inform her of Dr. Bustillo's assessment and she reported that she did speak with patient last night.  Patients sister reported that patient continues to be manic and making very inappropriate statements regarding patients on the unit.  Patients sister stated that patient reported that another patient was sitting on the couch masturbating and patient also made negative statements about another patient wanting to use the phone.  Patients sister reported that patient informed her sister that \"I'm gonna have to call security.\"  Patients sister reported that patient continues to talk about getting warrants on the daughter in law.  Patients sister reported that patient made statements threatening to go off/blow up if she does not get discharged. Patients sister reported that she would not feel safe with patient returning home in her current manic state.  Discussed with nursing staff patients sleep and discussed that patient had slept less than 4 hours last night.  Patient had reported that she wanted to ask the doctor for something to help her sleep however, she does not want to stay here any longer so she did not discuss sleep with the doctor.     ASSESSMENT:  Patients sister expresses ongoing concerns regarding patients lack of sleep and manic presentation.  Patients sister reports concerns for patients safety if she is discharged from the hospital today.       PLAN: Patient will continue stabilization. Patient will follow up with [facility] " upon stabilization.

## 2017-07-21 NOTE — DISCHARGE SUMMARY
Date of Discharge:  7/21/2017    Presenting Problem/History of Present Illness  Bipolar 1 disorder, manic, mild [F31.11]   Interval history  patient was seen today, she reports doing well she is anxious for discharge, she denies depression, has slept about 5 or 6 hours, she denies suicidal thoughts or homicidal thoughts hallucinations or paranoia.  She is oriented ×3, her stay has been uneventful since yesterday, nurse's note indicates the patient is easily agitated, this has possibly to do with her at being anxious for discharge, we will discharge today after therapist completed discharge planning,  She agrees to follow-up as outpatient, take her medications as prescribed.  Hospital Course  Patient is hospitalized on 7/10/2017 , he still special precautions level III and Seroquel 100 mg at bedtime I saw the patient on 7 7/11/2017 patient was noted to be paranoid Seroquel was increased to 100 mg twice a day.,  It was subsequently increased to 100 mg a.m. and 300 mg at bedtime after verification of her outpatient dose 300 mg twice a day was obtained , on 7/14/2017 Seroquel was increased to 200 mg a.m. and 400 mg at bedtime , patient failed to show much improvement , she responded some to when necessary Geodon injections .she was thus started on Geodon  20g twice a day  on 7/18/2017 , Seroquel 200 mg was discontinued in a.m. , U Don was increased to 40 mg twice a day on 7/20/2017 patient was seen again on 7/21/2017 , when she was noted to be improved as described above .she was anxious for discharge , plan was to discharge after therapist had formed with 2 sisters that they were okay with discharge .    Procedures Performed         Consults:   Consults     No orders found from 6/11/2017 to 7/11/2017.          Pertinent Test Results: Results Review: CMP has shown low anion gap off 2.4, CBC has shown low hemoglobin at 9.9 and hematocrit of 30.3 urinalysis is negative.  Urine drug screen is negative.  EKG on 7/19/2017  was read as showing s normal sinus rhythm normal ECG.    Condition on Discharge:  Stable      Discharge Disposition  Home or Self Care    Discharge Medications   Luana Meyer   Home Medication Instructions HORTENCIA:[id number]    Printed on:07/21/17 2094   Medication Information                      hydrOXYzine (ATARAX) 50 MG tablet  Take 1 tablet by mouth Daily As Needed for Anxiety for up to 15 days.             prazosin (MINIPRESS) 1 MG capsule  Take 1 capsule by mouth Every Night for 15 days.             QUEtiapine (SEROquel) 400 MG tablet  Take 1 tablet by mouth Every Night for 15 days.             ziprasidone (GEODON) 40 MG capsule  Take 1 capsule by mouth 2 (Two) Times a Day With Meals for 15 days.               Addendum  I reviewed notes from the therapist, have seen patient again, patient admits that she called her son only once, told him that she loved him, she still remains appropriate and anxious for discharge, she is agreeable to [facility]up at [facility] in [facility], take her medications as prescribed, I will discharge her today.        Discharge Diagnosis: Assessment: Assessment: Psychosis NOS  Bipolar disorder hypomanic with psychosis        Prognosis: Fair with psychiatric compliance            Tavo Bustillo MD  07/21/17  1:38 PM

## 2017-07-21 NOTE — NURSING NOTE
"This patient Filed a grievance form against one of the staff on [employer]. After reviewing the form I met with the patient to ask a few questions. The grievance complaint was that a \"black man searched her belongings, and he came to her room and looked in the door at her body, and he was searching where he didn't belong\". I questioned her to what exactly these comments meant. She replied I don't like black people, I guess I'm prejudiced. Every black person I know steals things. I explain to the patient that the tech was only doing his job. It was his job to do room searches and check belongings to keep all our patients safe. And it was his job to do 15 minute checks for safety. She stated \" I don't want him looking in my stuff.\" I believe the patient was upset because we had found several dishes from the cafeteria in her belongings that she had planned to take home with her. We also found sheets,books and pillows in her bags as well. This information was reviewed with Padmaja Tejeda of risk management and papers were faxed.    "

## 2017-07-21 NOTE — PLAN OF CARE
"Problem:  Patient Care Overview (Adult)  Goal: Interdisciplinary Rounds/Family Conference  Outcome: Ongoing (interventions implemented as appropriate)    07/21/17 1140   Interdisciplinary Rounds/Family Conf   Summary Contacted patients daughter in law   Participants social work;family   Contacted patients daughter in law and discussed that Dr. Bustillo had assessed patient today and reports that she is ready to return home.  Patients daughter in law reported that patient called their home multiple times last night.  Patients daughter in law reported that patient was talking so fast that her and patients son struggled to understand her and that patient was mumbling.  She reported that patient \"threatens to kill me.\"  She reported that she and her  do not see any improvement in patients presentation and that patient remains agitated.  Discussed that patient had reportedly slept just under 4 hours last night and patients daughter in law reported that patient would need further sleep in order to show improvement.             "

## 2017-07-21 NOTE — PLAN OF CARE
Problem:  Patient Care Overview (Adult)  Goal: Plan of Care Review    07/21/17 0553   Coping/Psychosocial Response Interventions   Plan Of Care Reviewed With patient   Coping/Psychosocial   Patient Agreement with Plan of Care agrees   Patient Care Overview   Progress no change   Outcome Evaluation   Outcome Summary/Follow up Plan Pt agumentative with staff and pts is easily agitated.

## 2024-04-18 NOTE — PLAN OF CARE
FOODS AND LIQUIDS WHICH AFFECT YOUR BLADDER     The follow foods and beverages are irritating to your ladder and may aggravate incontinence or urine leakage.     Citrus fruits and juices                   Carbonated beverages (regular and decaf)  Coffee                                               Chocolate                                       Soft drinks with caffeine  Tea                                                 Milk and milk products          Chili, hot spicy foods                                 Medicines with caffeine  Corcoran Powder                                            Sugar  Highly spiced foods                        Artificial sweeteners  Hot Peppers                                   Honey, corn syrup  Tomatoes and tomato based foods          Cigarettes  Beer, wine, alcoholic beverages     Liquid Intake     Plain water is best to drink. Grape, cranberry, cherry, and apple juice are also good to drink, as they do not irritate the bladder.  You should drink 6-8 glasses of non-carbonated water per day. Drinking less liquid results in more concentrated urine which is more irritating to the bladder, encourages growth of bacteria, and leads to constipation.      Problem:  Patient Care Overview (Adult)  Goal: Plan of Care Review  Outcome: Ongoing (interventions implemented as appropriate)  Patient was in restraints earlier today for being aggressive with staff and bothering other patients on unit after repeatedly trying to redirect and diversions initiated by staff.  Patient still is agitated, constantly cursing at staff, had to be watched around other patients because she thought one patient had her shirt on and tried to get if off patient.  Confused and goes to room for only few minutes and was upset because all her linen she had packed was missing.  Did not answer assessment questions this morning because she does not listen to staff and changes things to go her way.    07/17/17 1433   Coping/Psychosocial Response Interventions   Plan Of Care Reviewed With patient   Coping/Psychosocial   Patient Agreement with Plan of Care agrees   Patient Care Overview   Progress improving